# Patient Record
Sex: MALE | Race: WHITE | NOT HISPANIC OR LATINO | Employment: OTHER | URBAN - METROPOLITAN AREA
[De-identification: names, ages, dates, MRNs, and addresses within clinical notes are randomized per-mention and may not be internally consistent; named-entity substitution may affect disease eponyms.]

---

## 2018-04-24 ENCOUNTER — LAB VISIT (OUTPATIENT)
Dept: LAB | Facility: HOSPITAL | Age: 68
End: 2018-04-24
Attending: FAMILY MEDICINE
Payer: MEDICARE

## 2018-04-24 DIAGNOSIS — R10.9 ABDOMINAL PAIN: Primary | ICD-10-CM

## 2018-04-24 LAB
AMYLASE SERPL-CCNC: 74 U/L
ERYTHROCYTE [DISTWIDTH] IN BLOOD BY AUTOMATED COUNT: 13 %
ERYTHROCYTE [SEDIMENTATION RATE] IN BLOOD BY WESTERGREN METHOD: 0 MM/HR
HCT VFR BLD AUTO: 49.2 %
HGB BLD-MCNC: 16.9 G/DL
LIPASE SERPL-CCNC: 41 U/L
MCH RBC QN AUTO: 33.3 PG
MCHC RBC AUTO-ENTMCNC: 34.3 G/DL
MCV RBC AUTO: 97 FL
PLATELET # BLD AUTO: 211 K/UL
PMV BLD AUTO: 10.8 FL
RBC # BLD AUTO: 5.08 M/UL
WBC # BLD AUTO: 9.11 K/UL

## 2018-04-24 PROCEDURE — 82150 ASSAY OF AMYLASE: CPT

## 2018-04-24 PROCEDURE — 36415 COLL VENOUS BLD VENIPUNCTURE: CPT

## 2018-04-24 PROCEDURE — 85651 RBC SED RATE NONAUTOMATED: CPT

## 2018-04-24 PROCEDURE — 85027 COMPLETE CBC AUTOMATED: CPT

## 2018-04-24 PROCEDURE — 83690 ASSAY OF LIPASE: CPT

## 2021-12-30 DIAGNOSIS — R07.9 CHEST PAIN, UNSPECIFIED: ICD-10-CM

## 2021-12-30 DIAGNOSIS — K21.00 REFLUX ESOPHAGITIS: ICD-10-CM

## 2021-12-30 DIAGNOSIS — R06.02 SOB (SHORTNESS OF BREATH): Primary | ICD-10-CM

## 2022-01-05 ENCOUNTER — HOSPITAL ENCOUNTER (OUTPATIENT)
Dept: RADIOLOGY | Facility: HOSPITAL | Age: 72
Discharge: HOME | End: 2022-01-05
Attending: FAMILY MEDICINE
Payer: MEDICARE

## 2022-01-05 ENCOUNTER — HOSPITAL ENCOUNTER (OUTPATIENT)
Dept: CARDIOLOGY | Facility: HOSPITAL | Age: 72
Discharge: HOME | End: 2022-01-05
Attending: FAMILY MEDICINE
Payer: MEDICARE

## 2022-01-05 DIAGNOSIS — K21.00 REFLUX ESOPHAGITIS: ICD-10-CM

## 2022-01-05 DIAGNOSIS — I10 HBP (HIGH BLOOD PRESSURE): ICD-10-CM

## 2022-01-05 DIAGNOSIS — R07.9 CHEST PAIN, UNSPECIFIED: ICD-10-CM

## 2022-01-05 DIAGNOSIS — R06.02 SOB (SHORTNESS OF BREATH): ICD-10-CM

## 2022-01-05 LAB
AORTIC ROOT ANNULUS: 3.22 CM
AORTIC VALVE CUSP SEPERATION: 2.11 CM
AV INDEX (PROSTH): 0.81
AV MEAN GRADIENT: 2 MMHG
AV PEAK GRADIENT: 4 MMHG
AV VALVE AREA: 3.73 CM2
AV VELOCITY RATIO: 0.93
CV ECHO LV RWT: 0.3 CM
DOP CALC AO PEAK VEL: 0.97 M/S
DOP CALC AO VTI: 22.83 CM
DOP CALC LVOT AREA: 4.6 CM2
DOP CALC LVOT DIAMETER: 2.42 CM
DOP CALC LVOT PEAK VEL: 0.9 M/S
DOP CALC LVOT STROKE VOLUME: 85.23 CM3
DOP CALC MV VTI: 26.74 CM
DOP CALCLVOT PEAK VEL VTI: 18.54 CM
E WAVE DECELERATION TIME: 375.21 MSEC
E/A RATIO: 0.73
E/E' RATIO: 7.38 M/S
ECHO LV POSTERIOR WALL: 0.82 CM (ref 0.6–1.1)
EJECTION FRACTION: 60 %
FRACTIONAL SHORTENING: 32 % (ref 28–44)
INTERVENTRICULAR SEPTUM: 1.02 CM (ref 0.6–1.1)
LA MAJOR: 4.32 CM
LA MINOR: 3.33 CM
LEFT ATRIUM SIZE: 3.43 CM
LEFT ATRIUM VOLUME MOD: 13.77 CM3
LEFT INTERNAL DIMENSION IN SYSTOLE: 3.75 CM (ref 2.1–4)
LEFT VENTRICLE DIASTOLIC VOLUME: 148.02 ML
LEFT VENTRICLE SYSTOLIC VOLUME: 59.96 ML
LEFT VENTRICULAR INTERNAL DIMENSION IN DIASTOLE: 5.51 CM (ref 3.5–6)
LEFT VENTRICULAR MASS: 191.78 G
LV LATERAL E/E' RATIO: 8 M/S
LV SEPTAL E/E' RATIO: 6.86 M/S
MV MEAN GRADIENT: 0 MMHG
MV PEAK A VEL: 0.66 M/S
MV PEAK E VEL: 0.48 M/S
MV PEAK GRADIENT: 2 MMHG
MV STENOSIS PRESSURE HALF TIME: 108.81 MS
MV VALVE AREA BY CONTINUITY EQUATION: 3.19 CM2
MV VALVE AREA P 1/2 METHOD: 2.02 CM2
PISA TR MAX VEL: 1.63 M/S
PV PEAK VELOCITY: 0.63 CM/S
RA MAJOR: 4.56 CM
RA PRESSURE: 3 MMHG
RIGHT VENTRICULAR END-DIASTOLIC DIMENSION: 3.04 CM
TDI LATERAL: 0.06 M/S
TDI SEPTAL: 0.07 M/S
TDI: 0.07 M/S
TR MAX PG: 11 MMHG
TV REST PULMONARY ARTERY PRESSURE: 14 MMHG

## 2022-01-05 PROCEDURE — 71046 XR CHEST PA AND LATERAL: ICD-10-PCS | Mod: 26,,, | Performed by: RADIOLOGY

## 2022-01-05 PROCEDURE — 93306 ECHO (CUPID ONLY): ICD-10-PCS | Mod: 26,,, | Performed by: INTERNAL MEDICINE

## 2022-01-05 PROCEDURE — 93356 ECHO (CUPID ONLY): ICD-10-PCS | Mod: ,,, | Performed by: INTERNAL MEDICINE

## 2022-01-05 PROCEDURE — 93356 MYOCRD STRAIN IMG SPCKL TRCK: CPT | Mod: ,,, | Performed by: INTERNAL MEDICINE

## 2022-01-05 PROCEDURE — 93306 TTE W/DOPPLER COMPLETE: CPT | Mod: 26,,, | Performed by: INTERNAL MEDICINE

## 2022-01-05 PROCEDURE — 71046 X-RAY EXAM CHEST 2 VIEWS: CPT | Mod: 26,,, | Performed by: RADIOLOGY

## 2022-01-05 PROCEDURE — 93356 MYOCRD STRAIN IMG SPCKL TRCK: CPT

## 2022-01-05 PROCEDURE — 71046 X-RAY EXAM CHEST 2 VIEWS: CPT | Mod: TC,FY

## 2022-07-18 ENCOUNTER — LAB VISIT (OUTPATIENT)
Dept: LAB | Facility: HOSPITAL | Age: 72
End: 2022-07-18
Attending: FAMILY MEDICINE
Payer: MEDICARE

## 2022-07-18 DIAGNOSIS — R21 RASH: ICD-10-CM

## 2022-07-18 DIAGNOSIS — N28.9 LESION IN TRANSPLANTED KIDNEY: ICD-10-CM

## 2022-07-18 DIAGNOSIS — L53.9 REDNESS: Primary | ICD-10-CM

## 2022-07-18 DIAGNOSIS — T86.19 LESION IN TRANSPLANTED KIDNEY: ICD-10-CM

## 2022-07-18 LAB
ALBUMIN SERPL BCP-MCNC: 4.3 G/DL (ref 3.5–5.2)
ALP SERPL-CCNC: 71 U/L (ref 55–135)
ALT SERPL W/O P-5'-P-CCNC: 23 U/L (ref 10–44)
ANION GAP SERPL CALC-SCNC: 9 MMOL/L (ref 8–16)
AST SERPL-CCNC: 24 U/L (ref 10–40)
BILIRUB SERPL-MCNC: 1 MG/DL (ref 0.1–1)
BUN SERPL-MCNC: 14 MG/DL (ref 8–23)
CALCIUM SERPL-MCNC: 9.5 MG/DL (ref 8.7–10.5)
CHLORIDE SERPL-SCNC: 105 MMOL/L (ref 95–110)
CO2 SERPL-SCNC: 26 MMOL/L (ref 23–29)
CREAT SERPL-MCNC: 1.1 MG/DL (ref 0.5–1.4)
EST. GFR  (AFRICAN AMERICAN): >60 ML/MIN/1.73 M^2
EST. GFR  (NON AFRICAN AMERICAN): >60 ML/MIN/1.73 M^2
GLUCOSE SERPL-MCNC: 91 MG/DL (ref 70–110)
POTASSIUM SERPL-SCNC: 5.2 MMOL/L (ref 3.5–5.1)
PROT SERPL-MCNC: 7 G/DL (ref 6–8.4)
SODIUM SERPL-SCNC: 140 MMOL/L (ref 136–145)

## 2022-07-18 PROCEDURE — 87491 CHLMYD TRACH DNA AMP PROBE: CPT | Performed by: FAMILY MEDICINE

## 2022-07-18 PROCEDURE — 36415 COLL VENOUS BLD VENIPUNCTURE: CPT | Performed by: FAMILY MEDICINE

## 2022-07-18 PROCEDURE — 80053 COMPREHEN METABOLIC PANEL: CPT | Performed by: FAMILY MEDICINE

## 2022-07-18 PROCEDURE — 86592 SYPHILIS TEST NON-TREP QUAL: CPT | Performed by: FAMILY MEDICINE

## 2022-07-18 PROCEDURE — 87591 N.GONORRHOEAE DNA AMP PROB: CPT | Performed by: FAMILY MEDICINE

## 2022-07-19 LAB — RPR SER QL: NORMAL

## 2022-07-20 LAB
C TRACH DNA SPEC QL NAA+PROBE: NOT DETECTED
N GONORRHOEA DNA SPEC QL NAA+PROBE: NOT DETECTED

## 2022-07-28 ENCOUNTER — HOSPITAL ENCOUNTER (OUTPATIENT)
Dept: RADIOLOGY | Facility: HOSPITAL | Age: 72
Discharge: HOME | End: 2022-07-28
Attending: FAMILY MEDICINE
Payer: MEDICARE

## 2022-07-28 DIAGNOSIS — N63.20 LEFT BREAST LUMP: ICD-10-CM

## 2022-07-28 DIAGNOSIS — N64.4 BREAST TENDERNESS IN MALE: ICD-10-CM

## 2022-07-28 PROCEDURE — 77066 DX MAMMO INCL CAD BI: CPT | Mod: 26,,, | Performed by: RADIOLOGY

## 2022-07-28 PROCEDURE — 76642 US BREAST LEFT LIMITED: ICD-10-PCS | Mod: 26,LT,, | Performed by: RADIOLOGY

## 2022-07-28 PROCEDURE — 77062 MAMMO DIGITAL DIAGNOSTIC BILAT WITH TOMO: ICD-10-PCS | Mod: 26,,, | Performed by: RADIOLOGY

## 2022-07-28 PROCEDURE — 76642 ULTRASOUND BREAST LIMITED: CPT | Mod: TC,LT

## 2022-07-28 PROCEDURE — 77066 DX MAMMO INCL CAD BI: CPT | Mod: TC

## 2022-07-28 PROCEDURE — 76642 ULTRASOUND BREAST LIMITED: CPT | Mod: 26,LT,, | Performed by: RADIOLOGY

## 2022-07-28 PROCEDURE — 77062 BREAST TOMOSYNTHESIS BI: CPT | Mod: 26,,, | Performed by: RADIOLOGY

## 2022-07-28 PROCEDURE — 77066 MAMMO DIGITAL DIAGNOSTIC BILAT WITH TOMO: ICD-10-PCS | Mod: 26,,, | Performed by: RADIOLOGY

## 2022-08-04 ENCOUNTER — OFFICE VISIT (OUTPATIENT)
Dept: SURGERY | Facility: CLINIC | Age: 72
End: 2022-08-04
Payer: MEDICARE

## 2022-08-04 VITALS
DIASTOLIC BLOOD PRESSURE: 91 MMHG | HEIGHT: 72 IN | SYSTOLIC BLOOD PRESSURE: 146 MMHG | HEART RATE: 63 BPM | WEIGHT: 178 LBS | BODY MASS INDEX: 24.11 KG/M2 | OXYGEN SATURATION: 97 %

## 2022-08-04 DIAGNOSIS — R22.2 MASS OF LEFT CHEST WALL: Primary | ICD-10-CM

## 2022-08-04 PROCEDURE — 88360 PR  TUMOR IMMUNOHISTOCHEM/MANUAL: ICD-10-PCS | Mod: 26,,, | Performed by: PATHOLOGY

## 2022-08-04 PROCEDURE — 88360 TUMOR IMMUNOHISTOCHEM/MANUAL: CPT | Mod: 26,,, | Performed by: PATHOLOGY

## 2022-08-04 PROCEDURE — 99999 PR PBB SHADOW E&M-EST. PATIENT-LVL III: CPT | Mod: PBBFAC,,, | Performed by: SURGERY

## 2022-08-04 PROCEDURE — 88341 IMHCHEM/IMCYTCHM EA ADD ANTB: CPT | Mod: 26,XU,, | Performed by: PATHOLOGY

## 2022-08-04 PROCEDURE — 88341 IMHCHEM/IMCYTCHM EA ADD ANTB: CPT | Mod: 59 | Performed by: PATHOLOGY

## 2022-08-04 PROCEDURE — 88305 TISSUE EXAM BY PATHOLOGIST: CPT | Mod: 26,,, | Performed by: PATHOLOGY

## 2022-08-04 PROCEDURE — 88341 PR IHC OR ICC EACH ADD'L SINGLE ANTIBODY  STAINPR: ICD-10-PCS | Mod: 26,XU,, | Performed by: PATHOLOGY

## 2022-08-04 PROCEDURE — 88360 TUMOR IMMUNOHISTOCHEM/MANUAL: CPT | Mod: 59 | Performed by: PATHOLOGY

## 2022-08-04 PROCEDURE — 88342 IMHCHEM/IMCYTCHM 1ST ANTB: CPT | Mod: 26,XU,, | Performed by: PATHOLOGY

## 2022-08-04 PROCEDURE — 99205 OFFICE O/P NEW HI 60 MIN: CPT | Mod: S$PBB,,, | Performed by: SURGERY

## 2022-08-04 PROCEDURE — 88342 IMHCHEM/IMCYTCHM 1ST ANTB: CPT | Performed by: PATHOLOGY

## 2022-08-04 PROCEDURE — 99205 PR OFFICE/OUTPT VISIT, NEW, LEVL V, 60-74 MIN: ICD-10-PCS | Mod: S$PBB,,, | Performed by: SURGERY

## 2022-08-04 PROCEDURE — 99213 OFFICE O/P EST LOW 20 MIN: CPT | Mod: PBBFAC | Performed by: SURGERY

## 2022-08-04 PROCEDURE — 88305 TISSUE EXAM BY PATHOLOGIST: CPT | Performed by: PATHOLOGY

## 2022-08-04 PROCEDURE — 88342 CHG IMMUNOCYTOCHEMISTRY: ICD-10-PCS | Mod: 26,XU,, | Performed by: PATHOLOGY

## 2022-08-04 PROCEDURE — 99999 PR PBB SHADOW E&M-EST. PATIENT-LVL III: ICD-10-PCS | Mod: PBBFAC,,, | Performed by: SURGERY

## 2022-08-04 PROCEDURE — 88305 TISSUE EXAM BY PATHOLOGIST: ICD-10-PCS | Mod: 26,,, | Performed by: PATHOLOGY

## 2022-08-04 NOTE — PROGRESS NOTES
[facility] Surgery H&P Note    Subjective:       Patient ID: Perez Kenyon is a 72 y.o. male.    Chief Complaint:  Doc I have a left chest mass.  HPI:  Perez Kenyon is a 72 y.o. male with no past medical history presents today as a new patient referral from Dr. Maguire for evaluation of left chest mass.  Patient stated that it started a couple weeks ago.  Hard area.  Behind nipple.  Left side.  Underwent mammogram and ultrasound.  Mammogram and ultrasound suggest BI-RADS 4, suspicious.  Biopsy recommended.  Patient subsequently referred surgery Clinic today for evaluation.    History reviewed. No pertinent past medical history.  Past Surgical History:   Procedure Laterality Date    ROTATOR CUFF REPAIR Right 2008     History reviewed. No pertinent family history.  Social History     Socioeconomic History    Marital status:    Occupational History    Occupation: retired   Tobacco Use    Smoking status: Never Smoker    Smokeless tobacco: Never Used   Substance and Sexual Activity    Alcohol use: Yes    Drug use: Not Currently    Sexual activity: Yes     Partners: Female       No current outpatient medications on file.     No current facility-administered medications for this visit.     Review of patient's allergies indicates:  No Known Allergies    Review of Systems   Constitutional: Negative for appetite change, chills and fever.   HENT: Negative for congestion, dental problem and drooling.    Eyes: Negative for photophobia, discharge and itching.   Respiratory: Negative for apnea and chest tightness.    Cardiovascular: Negative for chest pain, palpitations and leg swelling.   Gastrointestinal: Negative for abdominal distention and abdominal pain.   Endocrine: Negative for cold intolerance and heat intolerance.   Genitourinary: Negative for difficulty urinating and dysuria.   Musculoskeletal: Negative for arthralgias and back pain.   Skin: Negative for color change and pallor.    Neurological: Negative for dizziness, facial asymmetry and headaches.   Hematological: Negative for adenopathy. Does not bruise/bleed easily.   Psychiatric/Behavioral: Negative for agitation, behavioral problems and confusion.       Objective:      Vitals:    08/04/22 1040   BP: (!) 146/91   BP Location: Left arm   Patient Position: Sitting   BP Method: Medium (Automatic)   Pulse: 63   SpO2: 97%   Weight: 80.7 kg (178 lb)   Height: 6' (1.829 m)     Physical Exam  Constitutional:       Appearance: He is well-developed. He is not diaphoretic.   HENT:      Head: Normocephalic and atraumatic.   Eyes:      Pupils: Pupils are equal, round, and reactive to light.   Neck:      Thyroid: No thyromegaly.   Cardiovascular:      Rate and Rhythm: Normal rate and regular rhythm.      Heart sounds: No murmur heard.  Pulmonary:      Effort: Pulmonary effort is normal. No respiratory distress.      Breath sounds: Normal breath sounds.   Chest:       Abdominal:      General: Bowel sounds are normal. There is no distension.      Palpations: Abdomen is soft.      Tenderness: There is no abdominal tenderness.   Musculoskeletal:         General: Normal range of motion.      Cervical back: Normal range of motion and neck supple.   Skin:     General: Skin is warm.      Capillary Refill: Capillary refill takes less than 2 seconds.      Findings: No erythema or rash.   Neurological:      Mental Status: He is alert and oriented to person, place, and time.      Cranial Nerves: No cranial nerve deficit.       Mammogram ultrasound reviewed.     Assessment:       1. Mass of left chest wall        Plan:   Mass of left chest wall        Medical Decision Making/Counseling:  Mass left chest wall.  Concerning.  BI-RADS 4.  Recommended core needle biopsy today in clinic.  Patient agreed.  Proceed with punch biopsy in clinic.  X2.  Will send specimen [facility].  Will have patient follow up next week.    Patient was localized with 0.25%  Marcaine with epinephrine.  Left chest wall prepped draped.  4 mm punch biopsy was used x2 for excision.  Handed off in permanent section.  Dry dressings applied over top of procedural site.  Time-out was conducted prior to procedure.  Informed consent was obtained prior and sign.    Patient instructed that best way to communicate with my office staff is for patient to get on the [facility] epic patient portal to expedite communication and communication issues that may occur.  Patient was given instructions on how to get on the portal.  I encouraged patient to obtain portal access as well.  Ultimately it is up to the patient to obtain access.  Patient voiced understanding.

## 2022-08-10 DIAGNOSIS — N50.89 SCROTAL MASS: Primary | ICD-10-CM

## 2022-08-12 ENCOUNTER — HOSPITAL ENCOUNTER (OUTPATIENT)
Dept: RADIOLOGY | Facility: HOSPITAL | Age: 72
Discharge: HOME | End: 2022-08-12
Attending: SPECIALIST
Payer: MEDICARE

## 2022-08-12 DIAGNOSIS — N50.89 SCROTAL MASS: ICD-10-CM

## 2022-08-12 PROCEDURE — 76870 US EXAM SCROTUM: CPT | Mod: TC

## 2022-08-15 LAB
COMMENT: NORMAL
FINAL PATHOLOGIC DIAGNOSIS: NORMAL
GROSS: NORMAL
Lab: NORMAL
MICROSCOPIC EXAM: NORMAL

## 2022-08-16 ENCOUNTER — ANESTHESIA EVENT (OUTPATIENT)
Dept: SURGERY | Facility: HOSPITAL | Age: 72
End: 2022-08-16
Payer: MEDICARE

## 2022-08-16 ENCOUNTER — HOSPITAL ENCOUNTER (OUTPATIENT)
Dept: CARDIOLOGY | Facility: HOSPITAL | Age: 72
Discharge: HOME | End: 2022-08-16
Attending: SURGERY
Payer: MEDICARE

## 2022-08-16 ENCOUNTER — OFFICE VISIT (OUTPATIENT)
Dept: SURGERY | Facility: CLINIC | Age: 72
End: 2022-08-16
Payer: MEDICARE

## 2022-08-16 ENCOUNTER — HOSPITAL ENCOUNTER (OUTPATIENT)
Dept: PREADMISSION TESTING | Facility: HOSPITAL | Age: 72
Discharge: HOME | End: 2022-08-16
Attending: SURGERY
Payer: MEDICARE

## 2022-08-16 VITALS
HEART RATE: 58 BPM | WEIGHT: 179 LBS | BODY MASS INDEX: 24.24 KG/M2 | HEIGHT: 72 IN | OXYGEN SATURATION: 98 % | SYSTOLIC BLOOD PRESSURE: 178 MMHG | DIASTOLIC BLOOD PRESSURE: 92 MMHG

## 2022-08-16 DIAGNOSIS — Z17.0 MALIGNANT NEOPLASM OF NIPPLE OF LEFT BREAST IN MALE, ESTROGEN RECEPTOR POSITIVE: ICD-10-CM

## 2022-08-16 DIAGNOSIS — Z17.0 MALIGNANT NEOPLASM OF NIPPLE OF LEFT BREAST IN MALE, ESTROGEN RECEPTOR POSITIVE: Primary | ICD-10-CM

## 2022-08-16 DIAGNOSIS — C50.022 MALIGNANT NEOPLASM OF NIPPLE OF LEFT BREAST IN MALE, ESTROGEN RECEPTOR POSITIVE: Primary | ICD-10-CM

## 2022-08-16 DIAGNOSIS — C50.022 MALIGNANT NEOPLASM OF NIPPLE OF LEFT BREAST IN MALE, ESTROGEN RECEPTOR POSITIVE: ICD-10-CM

## 2022-08-16 PROCEDURE — 93010 EKG 12-LEAD: ICD-10-PCS | Mod: ,,, | Performed by: INTERNAL MEDICINE

## 2022-08-16 PROCEDURE — 99215 PR OFFICE/OUTPT VISIT, EST, LEVL V, 40-54 MIN: ICD-10-PCS | Mod: S$PBB,,, | Performed by: SURGERY

## 2022-08-16 PROCEDURE — 93010 ELECTROCARDIOGRAM REPORT: CPT | Mod: ,,, | Performed by: INTERNAL MEDICINE

## 2022-08-16 PROCEDURE — 99215 OFFICE O/P EST HI 40 MIN: CPT | Mod: S$PBB,,, | Performed by: SURGERY

## 2022-08-16 PROCEDURE — 99214 OFFICE O/P EST MOD 30 MIN: CPT | Mod: PBBFAC | Performed by: SURGERY

## 2022-08-16 PROCEDURE — 93005 ELECTROCARDIOGRAM TRACING: CPT

## 2022-08-16 PROCEDURE — 99999 PR PBB SHADOW E&M-EST. PATIENT-LVL IV: ICD-10-PCS | Mod: PBBFAC,,, | Performed by: SURGERY

## 2022-08-16 PROCEDURE — 99999 PR PBB SHADOW E&M-EST. PATIENT-LVL IV: CPT | Mod: PBBFAC,,, | Performed by: SURGERY

## 2022-08-16 RX ORDER — SODIUM CHLORIDE 9 MG/ML
INJECTION, SOLUTION INTRAVENOUS CONTINUOUS
Status: CANCELLED | OUTPATIENT
Start: 2022-08-16

## 2022-08-16 NOTE — PROGRESS NOTES
Patient scheduled for surgical procedure and pre-admit per Dr. Tinajero. Pre-admit scheduled on August 22, 2022. Left Mastectomy procedure scheduled for August 24, 2022. Patient given blue folder containing all pre-op, elizabeth-op, and post op instructions.

## 2022-08-16 NOTE — ANESTHESIA PREPROCEDURE EVALUATION
08/16/2022  Perez Kenyon is a 72 y.o., male.      Pre-op Assessment    I have reviewed the Patient Summary Reports.     I have reviewed the Nursing Notes.    I have reviewed the Medications.     Review of Systems  Anesthesia Hx:  No problems with previous Anesthesia  Neg history of prior surgery. Denies Family Hx of Anesthesia complications.   Denies Personal Hx of Anesthesia complications.   Social:  Non-Smoker    Hematology/Oncology:  Hematology Normal   Oncology Normal     EENT/Dental:EENT/Dental Normal   Cardiovascular:  Cardiovascular Normal     Pulmonary:  Pulmonary Normal    Renal/:  Renal/ Normal     Hepatic/GI:  Hepatic/GI Normal    Musculoskeletal:  Musculoskeletal Normal    Neurological:  Neurology Normal    Endocrine:  Endocrine Normal    Dermatological:  Skin Normal    Psych:  Psychiatric Normal           Physical Exam  General: Alert    Airway:  Mallampati: II   Mouth Opening: Normal  TM Distance: Normal  Neck ROM: Normal ROM    Dental:  Intact    Chest/Lungs:  Clear to auscultation, Normal Respiratory Rate    Heart:  Rate: Normal    Abdomen:  Normal        Anesthesia Plan  Type of Anesthesia, risks & benefits discussed:    Anesthesia Type: Gen ETT  Post Op Pain Control Plan: multimodal analgesia  Airway Plan: Direct, Post-Induction  Informed Consent: Patient consented to blood products? No  ASA Score: 2  Day of Surgery Review of History & Physical: H&P Update referred to the surgeon/provider.    Ready For Surgery From Anesthesia Perspective.     .

## 2022-08-16 NOTE — H&P
[facility] Surgery H&P Note    Subjective:       Patient ID: Perez Kenyon is a 72 y.o. male.    Chief Complaint:  Follow-up left chest pathology.  HPI:  Perez Kenyon is a 72 y.o. male history of no past medical problems presents today for follow-up evaluation after punch biopsy of left chest mass.  Biopsy revealed evidence of invasive ductal carcinoma ER, NV positive.  Her 2 Nu negative.  No clinical adenopathy in the axilla.  Patient desires to proceed with surgical intervention.    History reviewed. No pertinent past medical history.  Past Surgical History:   Procedure Laterality Date    ROTATOR CUFF REPAIR Right 2008     History reviewed. No pertinent family history.  Social History     Socioeconomic History    Marital status:    Occupational History    Occupation: retired   Tobacco Use    Smoking status: Never Smoker    Smokeless tobacco: Never Used   Substance and Sexual Activity    Alcohol use: Yes    Drug use: Not Currently    Sexual activity: Yes     Partners: Female       No current outpatient medications on file.     No current facility-administered medications for this visit.     Review of patient's allergies indicates:  No Known Allergies    Review of Systems   Constitutional: Negative for appetite change, chills and fever.   HENT: Negative for congestion, dental problem and drooling.    Eyes: Negative for photophobia, discharge and itching.   Respiratory: Negative for apnea and chest tightness.    Cardiovascular: Negative for chest pain, palpitations and leg swelling.   Gastrointestinal: Negative for abdominal distention and abdominal pain.   Endocrine: Negative for cold intolerance and heat intolerance.   Genitourinary: Negative for difficulty urinating and dysuria.   Musculoskeletal: Negative for arthralgias and back pain.   Skin: Negative for color change and pallor.   Neurological: Negative for dizziness, facial asymmetry and headaches.   Hematological: Negative for  adenopathy. Does not bruise/bleed easily.   Psychiatric/Behavioral: Negative for agitation, behavioral problems and confusion.       Objective:      Vitals:    08/16/22 1017   BP: (!) 178/92   BP Location: Left arm   Patient Position: Sitting   BP Method: Medium (Automatic)   Pulse: (!) 58   SpO2: 98%   Weight: 81.2 kg (179 lb)   Height: 6' (1.829 m)     Physical Exam  Constitutional:       Appearance: He is well-developed. He is not diaphoretic.   HENT:      Head: Normocephalic and atraumatic.   Eyes:      Pupils: Pupils are equal, round, and reactive to light.   Neck:      Thyroid: No thyromegaly.   Cardiovascular:      Rate and Rhythm: Normal rate and regular rhythm.      Heart sounds: No murmur heard.  Pulmonary:      Effort: Pulmonary effort is normal. No respiratory distress.      Breath sounds: Normal breath sounds.   Chest:       Abdominal:      General: Bowel sounds are normal. There is no distension.      Palpations: Abdomen is soft.      Tenderness: There is no abdominal tenderness.   Musculoskeletal:         General: Normal range of motion.      Cervical back: Normal range of motion and neck supple.   Skin:     General: Skin is warm.      Capillary Refill: Capillary refill takes less than 2 seconds.      Findings: No erythema or rash.   Neurological:      Mental Status: He is alert and oriented to person, place, and time.      Cranial Nerves: No cranial nerve deficit.         Lab Review:   CBC:   Lab Results   Component Value Date    WBC 9.11 04/24/2018    RBC 5.08 04/24/2018    HGB 16.9 04/24/2018    HCT 49.2 04/24/2018     04/24/2018     BMP:   Lab Results   Component Value Date    GLU 91 07/18/2022     07/18/2022    K 5.2 (H) 07/18/2022     07/18/2022    CO2 26 07/18/2022    BUN 14 07/18/2022    CREATININE 1.1 07/18/2022    CALCIUM 9.5 07/18/2022     Diagnostics Review:  Pathology reviewed.  Invasive ductal carcinoma.  ER TX positive HER2/karthikeyan negative.     Assessment:       1.  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, estrogen receptor positive  -     Case Request Operating Room: MASTECTOMY, BIOPSY, LYMPH NODE, AXILLARY  -     Full code; Standing  -     Place in Outpatient; Standing  -     Vital signs; Standing  -     Insert peripheral IV; Standing  -     Verify beta-blocker dose taken within 24 hours if patient is prescribed beta-blocker; Standing  -     Height and weight; Standing  -     Intake and output; Standing  -     Verify discontinuation of antithrombotics; Standing  -     POCT glucose; Standing  -     Verify blood consent; Standing  -     Verify consent; Standing  -     Diet NPO; Standing  -     Place sequential compression device; Standing  -     CBC Auto Differential; Future; Expected date: 11/16/2022  -     EKG 12-lead; Future; Expected date: 09/16/2022    Other orders  -     0.9%  NaCl infusion  -     ceFAZolin (ANCEF) 2 g in dextrose 5 % 50 mL IVPB        Medical Decision Making/Counseling:  Patient with left chest wall invasive ductal carcinoma.  Clinically negative nodes.  Options to include lumpectomy with radiation versus mastectomy have been discussed.  Lymph node sampling left side have been discussed.  After informed discussion with the patient and family in clinic today, risk benefits alternatives etcetera, they voiced understanding of the options as well as risk benefits and desires to proceed with left mastectomy with axillary sampling.  All questions were answered to the patient and family's satisfaction.    Total clinic time today with patient, in face-to-face interaction was 45 min, of which greater than half of that time was spent in face-to-face counseling.    Patient instructed that best way to communicate with my office staff is for patient to get on the [facility] [employer] patient portal to expedite communication and communication issues that may occur.   Patient was given instructions on how to get on the portal.  I encouraged patient to obtain portal access as well.  Ultimately it is up to the patient to obtain access.  Patient voiced understanding.

## 2022-08-16 NOTE — PROGRESS NOTES
Patient scheduled for surgical procedure and pre-admit per Dr. Tinajero. Pre-admit scheduled on August 16, 2022. Left Mastectomy procedure scheduled for August 17, 2022. Patient given blue folder containing all pre-op, elizabeth-op, and post op instructions.

## 2022-08-17 ENCOUNTER — HOSPITAL ENCOUNTER (OUTPATIENT)
Facility: HOSPITAL | Age: 72
Discharge: HOME | End: 2022-08-18
Attending: SURGERY | Admitting: SURGERY
Payer: MEDICARE

## 2022-08-17 ENCOUNTER — HOSPITAL ENCOUNTER (OUTPATIENT)
Dept: RADIOLOGY | Facility: HOSPITAL | Age: 72
Discharge: HOME | End: 2022-08-17
Attending: SURGERY
Payer: MEDICARE

## 2022-08-17 ENCOUNTER — ANESTHESIA (OUTPATIENT)
Dept: SURGERY | Facility: HOSPITAL | Age: 72
End: 2022-08-17
Payer: MEDICARE

## 2022-08-17 VITALS
HEART RATE: 62 BPM | OXYGEN SATURATION: 97 % | DIASTOLIC BLOOD PRESSURE: 103 MMHG | TEMPERATURE: 98 F | RESPIRATION RATE: 15 BRPM | SYSTOLIC BLOOD PRESSURE: 142 MMHG

## 2022-08-17 DIAGNOSIS — C50.912 MALIGNANT NEOPLASM OF LEFT BREAST: ICD-10-CM

## 2022-08-17 DIAGNOSIS — C50.022 MALIGNANT NEOPLASM OF NIPPLE OF LEFT BREAST IN MALE, ESTROGEN RECEPTOR POSITIVE: Primary | ICD-10-CM

## 2022-08-17 DIAGNOSIS — Z17.0 MALIGNANT NEOPLASM OF NIPPLE OF LEFT BREAST IN MALE, ESTROGEN RECEPTOR POSITIVE: Primary | ICD-10-CM

## 2022-08-17 DIAGNOSIS — N63.20 LEFT BREAST MASS: ICD-10-CM

## 2022-08-17 PROCEDURE — 63600175 PHARM REV CODE 636 W HCPCS: Performed by: NURSE ANESTHETIST, CERTIFIED REGISTERED

## 2022-08-17 PROCEDURE — 71000033 HC RECOVERY, INTIAL HOUR: Performed by: SURGERY

## 2022-08-17 PROCEDURE — 19307 PR MASTECTOMY, MODIFIED RADICAL: ICD-10-PCS | Mod: LT,,, | Performed by: SURGERY

## 2022-08-17 PROCEDURE — C1729 CATH, DRAINAGE: HCPCS | Performed by: SURGERY

## 2022-08-17 PROCEDURE — D9220A PRA ANESTHESIA: Mod: CRNA,,, | Performed by: NURSE ANESTHETIST, CERTIFIED REGISTERED

## 2022-08-17 PROCEDURE — 36000707: Performed by: SURGERY

## 2022-08-17 PROCEDURE — 38900 IO MAP OF SENT LYMPH NODE: CPT | Mod: LT,,, | Performed by: SURGERY

## 2022-08-17 PROCEDURE — D9220A PRA ANESTHESIA: Mod: ANES,,, | Performed by: ANESTHESIOLOGY

## 2022-08-17 PROCEDURE — 88341 PR IHC OR ICC EACH ADD'L SINGLE ANTIBODY  STAINPR: ICD-10-PCS | Mod: 26,,, | Performed by: PATHOLOGY

## 2022-08-17 PROCEDURE — 88341 IMHCHEM/IMCYTCHM EA ADD ANTB: CPT | Mod: 26,,, | Performed by: PATHOLOGY

## 2022-08-17 PROCEDURE — 78195 LYMPH SYSTEM IMAGING: CPT | Mod: 26,,, | Performed by: RADIOLOGY

## 2022-08-17 PROCEDURE — D9220A PRA ANESTHESIA: ICD-10-PCS | Mod: ANES,,, | Performed by: ANESTHESIOLOGY

## 2022-08-17 PROCEDURE — 19307 MAST MOD RAD: CPT | Mod: LT,,, | Performed by: SURGERY

## 2022-08-17 PROCEDURE — 63600175 PHARM REV CODE 636 W HCPCS: Performed by: ANESTHESIOLOGY

## 2022-08-17 PROCEDURE — 88342 IMHCHEM/IMCYTCHM 1ST ANTB: CPT | Mod: 26,,, | Performed by: PATHOLOGY

## 2022-08-17 PROCEDURE — 88309 TISSUE EXAM BY PATHOLOGIST: CPT | Mod: 26,,, | Performed by: PATHOLOGY

## 2022-08-17 PROCEDURE — 25000003 PHARM REV CODE 250: Performed by: SURGERY

## 2022-08-17 PROCEDURE — 88342 CHG IMMUNOCYTOCHEMISTRY: ICD-10-PCS | Mod: 26,,, | Performed by: PATHOLOGY

## 2022-08-17 PROCEDURE — 36000706: Performed by: SURGERY

## 2022-08-17 PROCEDURE — 88342 IMHCHEM/IMCYTCHM 1ST ANTB: CPT | Performed by: PATHOLOGY

## 2022-08-17 PROCEDURE — 71000039 HC RECOVERY, EACH ADD'L HOUR: Performed by: SURGERY

## 2022-08-17 PROCEDURE — 88309 TISSUE EXAM BY PATHOLOGIST: CPT | Performed by: PATHOLOGY

## 2022-08-17 PROCEDURE — 78195 NM LYMPHATICS AND LYMPH NODE IMAGING: ICD-10-PCS | Mod: 26,,, | Performed by: RADIOLOGY

## 2022-08-17 PROCEDURE — 88341 IMHCHEM/IMCYTCHM EA ADD ANTB: CPT | Performed by: PATHOLOGY

## 2022-08-17 PROCEDURE — 37000008 HC ANESTHESIA 1ST 15 MINUTES: Performed by: SURGERY

## 2022-08-17 PROCEDURE — 37000009 HC ANESTHESIA EA ADD 15 MINS: Performed by: SURGERY

## 2022-08-17 PROCEDURE — 88309 PR  SURG PATH,LEVEL VI: ICD-10-PCS | Mod: 26,,, | Performed by: PATHOLOGY

## 2022-08-17 PROCEDURE — A9541 TC99M SULFUR COLLOID: HCPCS

## 2022-08-17 PROCEDURE — 63600175 PHARM REV CODE 636 W HCPCS: Performed by: SURGERY

## 2022-08-17 PROCEDURE — 38900 PR INTRAOPERATIVE SENTINEL LYMPH NODE ID W DYE INJECTION: ICD-10-PCS | Mod: LT,,, | Performed by: SURGERY

## 2022-08-17 PROCEDURE — 25000003 PHARM REV CODE 250: Performed by: NURSE ANESTHETIST, CERTIFIED REGISTERED

## 2022-08-17 PROCEDURE — 71000015 HC POSTOP RECOV 1ST HR: Performed by: SURGERY

## 2022-08-17 PROCEDURE — D9220A PRA ANESTHESIA: ICD-10-PCS | Mod: CRNA,,, | Performed by: NURSE ANESTHETIST, CERTIFIED REGISTERED

## 2022-08-17 RX ORDER — KETOROLAC TROMETHAMINE 30 MG/ML
15 INJECTION, SOLUTION INTRAMUSCULAR; INTRAVENOUS ONCE
Status: COMPLETED | OUTPATIENT
Start: 2022-08-17 | End: 2022-08-17

## 2022-08-17 RX ORDER — SODIUM CHLORIDE 9 MG/ML
INJECTION, SOLUTION INTRAVENOUS CONTINUOUS
Status: DISCONTINUED | OUTPATIENT
Start: 2022-08-17 | End: 2022-08-18 | Stop reason: HOSPADM

## 2022-08-17 RX ORDER — OXYCODONE AND ACETAMINOPHEN 5; 325 MG/1; MG/1
1 TABLET ORAL EVERY 6 HOURS PRN
Qty: 30 TABLET | Refills: 0 | Status: SHIPPED | OUTPATIENT
Start: 2022-08-17 | End: 2022-08-27

## 2022-08-17 RX ORDER — EPHEDRINE SULFATE 50 MG/ML
INJECTION, SOLUTION INTRAVENOUS
Status: DISCONTINUED | OUTPATIENT
Start: 2022-08-17 | End: 2022-08-17

## 2022-08-17 RX ORDER — ONDANSETRON 2 MG/ML
4 INJECTION INTRAMUSCULAR; INTRAVENOUS DAILY PRN
Status: DISCONTINUED | OUTPATIENT
Start: 2022-08-17 | End: 2022-08-18 | Stop reason: HOSPADM

## 2022-08-17 RX ORDER — ONDANSETRON 4 MG/1
4 TABLET, FILM COATED ORAL EVERY 6 HOURS PRN
Qty: 30 TABLET | Refills: 0 | Status: SHIPPED | OUTPATIENT
Start: 2022-08-17 | End: 2022-08-27

## 2022-08-17 RX ORDER — ONDANSETRON 2 MG/ML
INJECTION INTRAMUSCULAR; INTRAVENOUS
Status: DISCONTINUED | OUTPATIENT
Start: 2022-08-17 | End: 2022-08-17

## 2022-08-17 RX ORDER — MORPHINE SULFATE 4 MG/ML
2 INJECTION, SOLUTION INTRAMUSCULAR; INTRAVENOUS EVERY 5 MIN PRN
Status: DISCONTINUED | OUTPATIENT
Start: 2022-08-17 | End: 2022-08-18 | Stop reason: HOSPADM

## 2022-08-17 RX ORDER — LIDOCAINE HYDROCHLORIDE 20 MG/ML
INJECTION, SOLUTION EPIDURAL; INFILTRATION; INTRACAUDAL; PERINEURAL
Status: DISCONTINUED | OUTPATIENT
Start: 2022-08-17 | End: 2022-08-17

## 2022-08-17 RX ORDER — CEFAZOLIN SODIUM 2 G/50ML
2 SOLUTION INTRAVENOUS
Status: COMPLETED | OUTPATIENT
Start: 2022-08-17 | End: 2022-08-17

## 2022-08-17 RX ORDER — FENTANYL CITRATE 50 UG/ML
INJECTION, SOLUTION INTRAMUSCULAR; INTRAVENOUS
Status: DISCONTINUED | OUTPATIENT
Start: 2022-08-17 | End: 2022-08-17

## 2022-08-17 RX ORDER — SODIUM CHLORIDE, SODIUM LACTATE, POTASSIUM CHLORIDE, CALCIUM CHLORIDE 600; 310; 30; 20 MG/100ML; MG/100ML; MG/100ML; MG/100ML
INJECTION, SOLUTION INTRAVENOUS CONTINUOUS
Status: DISCONTINUED | OUTPATIENT
Start: 2022-08-17 | End: 2022-08-18 | Stop reason: HOSPADM

## 2022-08-17 RX ORDER — OXYCODONE AND ACETAMINOPHEN 5; 325 MG/1; MG/1
1 TABLET ORAL
Status: DISCONTINUED | OUTPATIENT
Start: 2022-08-17 | End: 2022-08-18 | Stop reason: HOSPADM

## 2022-08-17 RX ORDER — MIDAZOLAM HYDROCHLORIDE 1 MG/ML
INJECTION INTRAMUSCULAR; INTRAVENOUS
Status: DISCONTINUED | OUTPATIENT
Start: 2022-08-17 | End: 2022-08-17

## 2022-08-17 RX ORDER — BUPIVACAINE HYDROCHLORIDE 5 MG/ML
INJECTION, SOLUTION EPIDURAL; INTRACAUDAL
Status: DISCONTINUED | OUTPATIENT
Start: 2022-08-17 | End: 2022-08-17 | Stop reason: HOSPADM

## 2022-08-17 RX ORDER — PROPOFOL 10 MG/ML
VIAL (ML) INTRAVENOUS
Status: DISCONTINUED | OUTPATIENT
Start: 2022-08-17 | End: 2022-08-17

## 2022-08-17 RX ORDER — LIDOCAINE HYDROCHLORIDE 10 MG/ML
1 INJECTION, SOLUTION EPIDURAL; INFILTRATION; INTRACAUDAL; PERINEURAL ONCE
Status: DISCONTINUED | OUTPATIENT
Start: 2022-08-17 | End: 2022-08-18 | Stop reason: HOSPADM

## 2022-08-17 RX ORDER — SODIUM CHLORIDE, SODIUM LACTATE, POTASSIUM CHLORIDE, CALCIUM CHLORIDE 600; 310; 30; 20 MG/100ML; MG/100ML; MG/100ML; MG/100ML
125 INJECTION, SOLUTION INTRAVENOUS CONTINUOUS
Status: DISCONTINUED | OUTPATIENT
Start: 2022-08-17 | End: 2022-08-18 | Stop reason: HOSPADM

## 2022-08-17 RX ORDER — DEXAMETHASONE SODIUM PHOSPHATE 4 MG/ML
INJECTION, SOLUTION INTRA-ARTICULAR; INTRALESIONAL; INTRAMUSCULAR; INTRAVENOUS; SOFT TISSUE
Status: DISCONTINUED | OUTPATIENT
Start: 2022-08-17 | End: 2022-08-17

## 2022-08-17 RX ADMIN — EPHEDRINE SULFATE 25 MG: 50 INJECTION INTRAVENOUS at 09:08

## 2022-08-17 RX ADMIN — MIDAZOLAM HYDROCHLORIDE 2 MG: 1 INJECTION, SOLUTION INTRAMUSCULAR; INTRAVENOUS at 09:08

## 2022-08-17 RX ADMIN — ONDANSETRON 4 MG: 2 INJECTION INTRAMUSCULAR; INTRAVENOUS at 09:08

## 2022-08-17 RX ADMIN — FENTANYL CITRATE 50 MCG: 50 INJECTION, SOLUTION INTRAMUSCULAR; INTRAVENOUS at 09:08

## 2022-08-17 RX ADMIN — FENTANYL CITRATE 50 MCG: 50 INJECTION, SOLUTION INTRAMUSCULAR; INTRAVENOUS at 10:08

## 2022-08-17 RX ADMIN — SODIUM CHLORIDE, POTASSIUM CHLORIDE, SODIUM LACTATE AND CALCIUM CHLORIDE: 600; 310; 30; 20 INJECTION, SOLUTION INTRAVENOUS at 09:08

## 2022-08-17 RX ADMIN — EPHEDRINE SULFATE 25 MG: 50 INJECTION INTRAVENOUS at 10:08

## 2022-08-17 RX ADMIN — MORPHINE SULFATE 2 MG: 4 INJECTION, SOLUTION INTRAMUSCULAR; INTRAVENOUS at 11:08

## 2022-08-17 RX ADMIN — LIDOCAINE HYDROCHLORIDE 100 MG: 20 INJECTION, SOLUTION EPIDURAL; INFILTRATION; INTRACAUDAL; PERINEURAL at 09:08

## 2022-08-17 RX ADMIN — DEXAMETHASONE SODIUM PHOSPHATE 4 MG: 4 INJECTION, SOLUTION INTRAMUSCULAR; INTRAVENOUS at 09:08

## 2022-08-17 RX ADMIN — CEFAZOLIN SODIUM 2 G: 2 SOLUTION INTRAVENOUS at 09:08

## 2022-08-17 RX ADMIN — PROPOFOL 200 MG: 10 INJECTION, EMULSION INTRAVENOUS at 09:08

## 2022-08-17 RX ADMIN — KETOROLAC TROMETHAMINE 15 MG: 30 INJECTION, SOLUTION INTRAMUSCULAR; INTRAVENOUS at 11:08

## 2022-08-17 NOTE — DISCHARGE SUMMARY
Discharge Note        SUMMARY     Admit Date: 8/17/2022    Attending Physician: Terry Tinajero MD     Discharge Physician: Terry Tinajero MD    Discharge Date: 8/17/2022 10:46 AM      Hospital Course: Patient tolerated procedure well.     Disposition: Home or Self Care    Patient Instructions:   Current Discharge Medication List      START taking these medications    Details   ondansetron (ZOFRAN) 4 MG tablet Take 1 tablet (4 mg total) by mouth every 6 (six) hours as needed for Nausea.  Qty: 30 tablet, Refills: 0      oxyCODONE-acetaminophen (PERCOCET) 5-325 mg per tablet Take 1 tablet by mouth every 6 (six) hours as needed for Pain.  Qty: 30 tablet, Refills: 0    Comments: n/a              Discharge Procedure Orders (must include Diet, Follow-up, Activity):   Discharge Procedure Orders (must include Diet, Follow-up, Activity)   Diet general     Lifting restrictions   Order Comments: No heavy lifting, pushing, pulling, bending, strenuous exercise greater than 10 lb for the next 6 weeks.     Other restrictions (specify):   Order Comments: No swimming or submersion of wounds in lakes, ponds, streams, oceans, bathtubs, etc until seen in clinic for postoperative evaluation.     No dressing needed     Call MD for:  temperature >100.4     Call MD for:  persistent nausea and vomiting     Call MD for:  severe uncontrolled pain     Call MD for:  difficulty breathing, headache or visual disturbances     Call MD for:  redness, tenderness, or signs of infection (pain, swelling, redness, odor or green/yellow discharge around incision site)     Call MD for:  persistent dizziness or light-headedness        Follow Up:  Follow up as scheduled.  Resume routine diet.  Activity as tolerated.    No driving day of procedure.

## 2022-08-17 NOTE — TRANSFER OF CARE
Anesthesia Transfer of Care Note    Patient: Perez Kenyon    Procedure(s) Performed: Procedure(s) (LRB):  MASTECTOMY (N/A)  BIOPSY, LYMPH NODE, AXILLARY (N/A)    Patient location: PACU    Anesthesia Type: general    Transport from OR: Transported from OR on room air with adequate spontaneous ventilation    Post pain: adequate analgesia    Post assessment: no apparent anesthetic complications    Post vital signs: stable    Level of consciousness: sedated and responds to stimulation    Nausea/Vomiting: no nausea/vomiting    Complications: none    Transfer of care protocol was followed      Last vitals:   Visit Vitals  BP (!) 170/104   Pulse 70   Temp 36.7 °C (98.1 °F)   Resp 16   SpO2 98%

## 2022-08-17 NOTE — H&P
[facility] Surgery H&P Note    Subjective:       Patient ID: Perez Kenyon is a 72 y.o. male.    Chief Complaint:  Follow-up left chest pathology.  HPI:  Perez Kenyon is a 72 y.o. male history of no past medical problems presents today for follow-up evaluation after punch biopsy of left chest mass.  Biopsy revealed evidence of invasive ductal carcinoma ER, NH positive.  Her 2 Nu negative.  No clinical adenopathy in the axilla.  Patient desires to proceed with surgical intervention.    No past medical history on file.  Past Surgical History:   Procedure Laterality Date    ROTATOR CUFF REPAIR Right 2008     No family history on file.  Social History     Socioeconomic History    Marital status:    Occupational History    Occupation: retired   Tobacco Use    Smoking status: Never Smoker    Smokeless tobacco: Never Used   Substance and Sexual Activity    Alcohol use: Yes    Drug use: Not Currently    Sexual activity: Yes     Partners: Female       No current facility-administered medications for this encounter.     Review of patient's allergies indicates:  No Known Allergies    Review of Systems   Constitutional: Negative for appetite change, chills and fever.   HENT: Negative for congestion, dental problem and drooling.    Eyes: Negative for photophobia, discharge and itching.   Respiratory: Negative for apnea and chest tightness.    Cardiovascular: Negative for chest pain, palpitations and leg swelling.   Gastrointestinal: Negative for abdominal distention and abdominal pain.   Endocrine: Negative for cold intolerance and heat intolerance.   Genitourinary: Negative for difficulty urinating and dysuria.   Musculoskeletal: Negative for arthralgias and back pain.   Skin: Negative for color change and pallor.   Neurological: Negative for dizziness, facial asymmetry and headaches.   Hematological: Negative for adenopathy. Does not bruise/bleed easily.   Psychiatric/Behavioral: Negative for  agitation, behavioral problems and confusion.       Objective:      There were no vitals filed for this visit.  Physical Exam  Constitutional:       Appearance: He is well-developed. He is not diaphoretic.   HENT:      Head: Normocephalic and atraumatic.   Eyes:      Pupils: Pupils are equal, round, and reactive to light.   Neck:      Thyroid: No thyromegaly.   Cardiovascular:      Rate and Rhythm: Normal rate and regular rhythm.      Heart sounds: No murmur heard.  Pulmonary:      Effort: Pulmonary effort is normal. No respiratory distress.      Breath sounds: Normal breath sounds.   Chest:       Abdominal:      General: Bowel sounds are normal. There is no distension.      Palpations: Abdomen is soft.      Tenderness: There is no abdominal tenderness.   Musculoskeletal:         General: Normal range of motion.      Cervical back: Normal range of motion and neck supple.   Skin:     General: Skin is warm.      Capillary Refill: Capillary refill takes less than 2 seconds.      Findings: No erythema or rash.   Neurological:      Mental Status: He is alert and oriented to person, place, and time.      Cranial Nerves: No cranial nerve deficit.         Lab Review:   CBC:   Lab Results   Component Value Date    WBC 8.72 08/16/2022    RBC 5.14 08/16/2022    HGB 17.0 08/16/2022    HCT 49.6 08/16/2022     08/16/2022     BMP:   Lab Results   Component Value Date    GLU 91 07/18/2022     07/18/2022    K 5.2 (H) 07/18/2022     07/18/2022    CO2 26 07/18/2022    BUN 14 07/18/2022    CREATININE 1.1 07/18/2022    CALCIUM 9.5 07/18/2022     Diagnostics Review:  Pathology reviewed.  Invasive ductal carcinoma.  ER NC positive HER2/karthikeyan negative.       Medical Decision Making/Counseling:  Patient with left chest wall invasive ductal carcinoma.  Clinically negative nodes.  Options to include lumpectomy with radiation versus mastectomy have been discussed.  Lymph node sampling left side have been discussed.  After  informed discussion with the patient and family in clinic today, risk benefits alternatives etcetera, they voiced understanding of the options as well as risk benefits and desires to proceed with left mastectomy with axillary sampling.  All questions were answered to the patient and family's satisfaction.    Total clinic time today with patient, in face-to-face interaction was 45 min, of which greater than half of that time was spent in face-to-face counseling.    Patient instructed that best way to communicate with my office staff is for patient to get on the [facility] epic patient portal to expedite communication and communication issues that may occur.  Patient was given instructions on how to get on the portal.  I encouraged patient to obtain portal access as well.  Ultimately it is up to the patient to obtain access.  Patient voiced understanding.

## 2022-08-17 NOTE — PLAN OF CARE
Extensive education provided to family and caregiver regarding wound care and management of LOYD drain. Family able to return demonstrate.

## 2022-08-17 NOTE — ANESTHESIA POSTPROCEDURE EVALUATION
Anesthesia Post Evaluation    Patient: Perez Kenyon    Procedure(s) Performed: Procedure(s) (LRB):  MASTECTOMY (N/A)  BIOPSY, LYMPH NODE, AXILLARY (N/A)    Final Anesthesia Type: general      Patient location during evaluation: PACU  Patient participation: Yes- Able to Participate  Level of consciousness: awake and awake and alert  Post-procedure vital signs: reviewed and stable  Pain management: adequate  Airway patency: patent    PONV status at discharge: No PONV  Anesthetic complications: no      Cardiovascular status: blood pressure returned to baseline  Respiratory status: unassisted and spontaneous ventilation  Hydration status: euvolemic  Follow-up not needed.          Vitals Value Taken Time   /103 08/17/22 1216   Temp 36.7 °C (98 °F) 08/17/22 1051   Pulse 62 08/17/22 1220   Resp 14 08/17/22 1222   SpO2 96 % 08/17/22 1222   Vitals shown include unvalidated device data.      Event Time   Out of Recovery 12:06:00         Pain/Alexys Score: Pain Rating Prior to Med Admin: 6 (8/17/2022 11:31 AM)  Alexys Score: 10 (8/17/2022 11:50 AM)  Modified Alexys Score: 18 (8/17/2022 12:05 PM)

## 2022-08-23 ENCOUNTER — OFFICE VISIT (OUTPATIENT)
Dept: SURGERY | Facility: CLINIC | Age: 72
End: 2022-08-23
Payer: MEDICARE

## 2022-08-23 VITALS
HEART RATE: 73 BPM | SYSTOLIC BLOOD PRESSURE: 149 MMHG | DIASTOLIC BLOOD PRESSURE: 84 MMHG | WEIGHT: 176 LBS | OXYGEN SATURATION: 98 % | HEIGHT: 72 IN | BODY MASS INDEX: 23.84 KG/M2

## 2022-08-23 DIAGNOSIS — Z09 POSTOP CHECK: Primary | ICD-10-CM

## 2022-08-23 PROCEDURE — 99999 PR PBB SHADOW E&M-EST. PATIENT-LVL III: CPT | Mod: PBBFAC,,, | Performed by: SURGERY

## 2022-08-23 PROCEDURE — 99213 OFFICE O/P EST LOW 20 MIN: CPT | Mod: PBBFAC | Performed by: SURGERY

## 2022-08-23 PROCEDURE — 99024 POSTOP FOLLOW-UP VISIT: CPT | Mod: POP,,, | Performed by: SURGERY

## 2022-08-23 PROCEDURE — 99024 PR POST-OP FOLLOW-UP VISIT: ICD-10-PCS | Mod: POP,,, | Performed by: SURGERY

## 2022-08-23 PROCEDURE — 99999 PR PBB SHADOW E&M-EST. PATIENT-LVL III: ICD-10-PCS | Mod: PBBFAC,,, | Performed by: SURGERY

## 2022-08-23 NOTE — PROGRESS NOTES
General Surgery  [facility]  Follow-up    HPI/Follow-up exam:  Perez Kenyon is a 72 y.o. male presents today for follow-up examination after left mastectomy with axillary node excision.  Patient since surgeries done well.  No apparent postsurgical issues.  LOYD drain with greater than 30 cc serosanguineous fluid output.  Some bruising in the left axilla to be expected.  Surgical site clean dry intact no signs or symptoms of infection.  No other new issues or complaints.    PHYSICAL EXAM:  BP (!) 149/84 (BP Location: Left arm, Patient Position: Sitting, BP Method: Medium (Automatic))   Pulse 73   Ht 6' (1.829 m)   Wt 79.8 kg (176 lb)   SpO2 98%   BMI 23.87 kg/m²   Gen: Wd Wn male in NAD  Heent: Nc/At, MMM  Cv: RRR  Lung: Non-labored breathing, clear bilaterally  Abd: Soft, non-tender, non-distended  Ext: No cyanosis clubbing or edema  Left chest site no signs or symptoms of infection.  Minimal swelling.  LOYD drain with serosanguineous output.    Pathology:  Final pathology pending.    Assessment:  Perez Kenyon is a 72 y.o. male s/p left mastectomy with axillary lymph node sampling    Plan/Medical Decision Making:  Doing well.  No issues.  Continue current therapy.  Wrap chest with Ace wrap every 12-24 hours.  Keep LOYD drain.  Follow-up surgery clinic 1 week.    Followup:  1 week.    Patient instructed that best way to communicate with my office staff is for patient to get on the [facility] epic patient portal to expedite communication and communication issues that may occur.  Patient was given instructions on how to get on the portal.  I encouraged patient to obtain portal access as well.  Ultimately it is up to the patient to obtain access.  Patient voiced understanding.

## 2022-09-01 ENCOUNTER — OFFICE VISIT (OUTPATIENT)
Dept: SURGERY | Facility: CLINIC | Age: 72
End: 2022-09-01
Payer: MEDICARE

## 2022-09-01 DIAGNOSIS — C50.922 MALIGNANT NEOPLASM OF LEFT BREAST IN MALE, ESTROGEN RECEPTOR POSITIVE, UNSPECIFIED SITE OF BREAST: Primary | ICD-10-CM

## 2022-09-01 DIAGNOSIS — Z17.0 MALIGNANT NEOPLASM OF LEFT BREAST IN MALE, ESTROGEN RECEPTOR POSITIVE, UNSPECIFIED SITE OF BREAST: Primary | ICD-10-CM

## 2022-09-01 PROCEDURE — 99024 POSTOP FOLLOW-UP VISIT: CPT | Mod: POP,,, | Performed by: SURGERY

## 2022-09-01 PROCEDURE — 99212 OFFICE O/P EST SF 10 MIN: CPT | Mod: PBBFAC | Performed by: SURGERY

## 2022-09-01 PROCEDURE — 99999 PR PBB SHADOW E&M-EST. PATIENT-LVL II: CPT | Mod: PBBFAC,,, | Performed by: SURGERY

## 2022-09-01 PROCEDURE — 99024 PR POST-OP FOLLOW-UP VISIT: ICD-10-PCS | Mod: POP,,, | Performed by: SURGERY

## 2022-09-01 PROCEDURE — 99999 PR PBB SHADOW E&M-EST. PATIENT-LVL II: ICD-10-PCS | Mod: PBBFAC,,, | Performed by: SURGERY

## 2022-09-01 NOTE — PROGRESS NOTES
General Surgery  [facility]  Follow-up    HPI/Follow-up exam:  Perez Kenyon is a 72 y.o. 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  Continues to have 50-60 cc serous output through LOYD drain.  Patient doing well today.  No new issues or complaints.    PHYSICAL EXAM:  There were no vitals taken for this visit.  Gen: Wd Wn male in NAD  Heent: Nc/At, MMM  Cv: RRR  Lung: Non-labored breathing, clear bilaterally  Abd: Soft, non-tender, non-distended  Ext: No cyanosis clubbing or edema  Surgical site clean dry intact no signs or symptoms of infection.  LOYD drain with serous output.    Pathology:  T1b N0 mucinous carcinoma left breast.  All margins negative except skin margin near nipple, however significant amount of tissue, skin, was removed superior inferior medial lateral etcetera at this site which was all negative.    Assessment:  Perez Kenyon is a 72 y.o. male s/p left chest wall resection for carcinoma.    Plan/Medical Decision Making:  Doing well.  Continue LOYD drain.  Desires follow-up with MD Ravi for Oncology.  Will arrange.  Follow-up surgery clinic 1 week.    Followup:  1 week.    Patient instructed that best way to communicate with my office staff is for patient to get on the [facility] patient portal to expedite communication and communication issues that may occur.  Patient was given instructions on how to get on the portal.  I encouraged patient to obtain portal access as well.  Ultimately it is up to the patient to obtain access.  Patient voiced understanding.

## 2022-09-08 ENCOUNTER — OFFICE VISIT (OUTPATIENT)
Dept: SURGERY | Facility: CLINIC | Age: 72
End: 2022-09-08
Payer: MEDICARE

## 2022-09-08 VITALS
OXYGEN SATURATION: 96 % | HEART RATE: 59 BPM | WEIGHT: 176 LBS | DIASTOLIC BLOOD PRESSURE: 86 MMHG | BODY MASS INDEX: 23.84 KG/M2 | SYSTOLIC BLOOD PRESSURE: 165 MMHG | HEIGHT: 72 IN

## 2022-09-08 DIAGNOSIS — Z09 POSTOP CHECK: Primary | ICD-10-CM

## 2022-09-08 PROCEDURE — 99024 PR POST-OP FOLLOW-UP VISIT: ICD-10-PCS | Mod: POP,,, | Performed by: SURGERY

## 2022-09-08 PROCEDURE — 99999 PR PBB SHADOW E&M-EST. PATIENT-LVL III: ICD-10-PCS | Mod: PBBFAC,,, | Performed by: SURGERY

## 2022-09-08 PROCEDURE — 99024 POSTOP FOLLOW-UP VISIT: CPT | Mod: POP,,, | Performed by: SURGERY

## 2022-09-08 PROCEDURE — 99999 PR PBB SHADOW E&M-EST. PATIENT-LVL III: CPT | Mod: PBBFAC,,, | Performed by: SURGERY

## 2022-09-08 PROCEDURE — 99213 OFFICE O/P EST LOW 20 MIN: CPT | Mod: PBBFAC | Performed by: SURGERY

## 2022-09-08 NOTE — PROGRESS NOTES
Doing well postop.  Surgical site looks great.  LOYD drain still placing output of 45-50 cc per day serous fluid.  No new issues or complaints.  Follow-up 1 week.  Maintain drain.

## 2022-09-15 ENCOUNTER — OFFICE VISIT (OUTPATIENT)
Dept: SURGERY | Facility: CLINIC | Age: 72
End: 2022-09-15
Payer: MEDICARE

## 2022-09-15 VITALS
OXYGEN SATURATION: 96 % | SYSTOLIC BLOOD PRESSURE: 162 MMHG | HEART RATE: 61 BPM | HEIGHT: 72 IN | DIASTOLIC BLOOD PRESSURE: 89 MMHG | RESPIRATION RATE: 17 BRPM | WEIGHT: 175 LBS | BODY MASS INDEX: 23.7 KG/M2

## 2022-09-15 DIAGNOSIS — Z09 POSTOP CHECK: Primary | ICD-10-CM

## 2022-09-15 PROCEDURE — 99999 PR PBB SHADOW E&M-EST. PATIENT-LVL III: CPT | Mod: PBBFAC,,, | Performed by: SURGERY

## 2022-09-15 PROCEDURE — 99213 OFFICE O/P EST LOW 20 MIN: CPT | Mod: PBBFAC | Performed by: SURGERY

## 2022-09-15 PROCEDURE — 99999 PR PBB SHADOW E&M-EST. PATIENT-LVL III: ICD-10-PCS | Mod: PBBFAC,,, | Performed by: SURGERY

## 2022-09-15 PROCEDURE — 99024 PR POST-OP FOLLOW-UP VISIT: ICD-10-PCS | Mod: POP,,, | Performed by: SURGERY

## 2022-09-15 PROCEDURE — 99024 POSTOP FOLLOW-UP VISIT: CPT | Mod: POP,,, | Performed by: SURGERY

## 2022-09-15 NOTE — PROGRESS NOTES
Doing well today.  No issues.  LOYD drain was less than 5 cc output a day.  Removed today.  Ace wrap placed.  Utilize Ace wrap every 24 hours for the next week to decrease chance of seroma formation.  Follow-up surgery clinic 2 weeks.

## 2022-09-29 ENCOUNTER — OFFICE VISIT (OUTPATIENT)
Dept: SURGERY | Facility: CLINIC | Age: 72
End: 2022-09-29
Payer: MEDICARE

## 2022-09-29 VITALS
SYSTOLIC BLOOD PRESSURE: 181 MMHG | DIASTOLIC BLOOD PRESSURE: 92 MMHG | WEIGHT: 176 LBS | OXYGEN SATURATION: 97 % | HEART RATE: 53 BPM | HEIGHT: 72 IN | BODY MASS INDEX: 23.84 KG/M2

## 2022-09-29 DIAGNOSIS — Z09 POSTOP CHECK: Primary | ICD-10-CM

## 2022-09-29 PROCEDURE — 99213 OFFICE O/P EST LOW 20 MIN: CPT | Mod: PBBFAC | Performed by: SURGERY

## 2022-09-29 PROCEDURE — 99024 POSTOP FOLLOW-UP VISIT: CPT | Mod: POP,,, | Performed by: SURGERY

## 2022-09-29 PROCEDURE — 99999 PR PBB SHADOW E&M-EST. PATIENT-LVL III: CPT | Mod: PBBFAC,,, | Performed by: SURGERY

## 2022-09-29 PROCEDURE — 99999 PR PBB SHADOW E&M-EST. PATIENT-LVL III: ICD-10-PCS | Mod: PBBFAC,,, | Performed by: SURGERY

## 2022-09-29 PROCEDURE — 99024 PR POST-OP FOLLOW-UP VISIT: ICD-10-PCS | Mod: POP,,, | Performed by: SURGERY

## 2022-09-29 NOTE — PROGRESS NOTES
Doing well post mastectomy.  No issues.  Return to normal activity.  F/u after first of the year.

## 2022-10-13 ENCOUNTER — TELEPHONE (OUTPATIENT)
Dept: SURGERY | Facility: CLINIC | Age: 72
End: 2022-10-13
Payer: MEDICARE

## 2022-10-13 ENCOUNTER — PATIENT MESSAGE (OUTPATIENT)
Dept: SURGERY | Facility: CLINIC | Age: 72
End: 2022-10-13
Payer: MEDICARE

## 2022-10-13 NOTE — TELEPHONE ENCOUNTER
----- Message from Ace Young sent at 10/13/2022 10:17 AM CDT -----  Type: Patient Call Back         Who called: Pt          What is the request in detail:  Pt states that something is not right with the mastectomy cont care drain removal . And needs to be seen by           Can the clinic reply by [facility]? No          Would the patient rather a call back or a response via [facility]? Call back          Best call back number:[phone] (mobile)          Additional Information:           Thank You

## 2022-10-13 NOTE — TELEPHONE ENCOUNTER
Called pt x 4. LVM and sent patient a portal message to come to clinic today before 3 pm to be seen by Dr Tinajero.

## 2022-10-21 ENCOUNTER — OFFICE VISIT (OUTPATIENT)
Dept: SURGERY | Facility: CLINIC | Age: 72
End: 2022-10-21
Payer: MEDICARE

## 2022-10-21 VITALS
OXYGEN SATURATION: 98 % | RESPIRATION RATE: 17 BRPM | WEIGHT: 180 LBS | DIASTOLIC BLOOD PRESSURE: 91 MMHG | SYSTOLIC BLOOD PRESSURE: 163 MMHG | HEIGHT: 72 IN | HEART RATE: 59 BPM | BODY MASS INDEX: 24.38 KG/M2

## 2022-10-21 DIAGNOSIS — Z09 POSTOP CHECK: Primary | ICD-10-CM

## 2022-10-21 PROCEDURE — 99999 PR PBB SHADOW E&M-EST. PATIENT-LVL III: ICD-10-PCS | Mod: PBBFAC,,, | Performed by: SURGERY

## 2022-10-21 PROCEDURE — 99024 PR POST-OP FOLLOW-UP VISIT: ICD-10-PCS | Mod: POP,,, | Performed by: SURGERY

## 2022-10-21 PROCEDURE — 99999 PR PBB SHADOW E&M-EST. PATIENT-LVL III: CPT | Mod: PBBFAC,,, | Performed by: SURGERY

## 2022-10-21 PROCEDURE — 99024 POSTOP FOLLOW-UP VISIT: CPT | Mod: POP,,, | Performed by: SURGERY

## 2022-10-21 PROCEDURE — 99213 OFFICE O/P EST LOW 20 MIN: CPT | Mod: PBBFAC | Performed by: SURGERY

## 2022-10-21 NOTE — PROGRESS NOTES
Patient is status post left mastectomy.  Doing well.  There is a site lateral aspect of his incision that has a mild dog-ear.  Additionally maybe a stitch granuloma at this site.  Resolved.  No redness.  Recommendation be repeat evaluation 6 weeks.  Possible excision elliptical this area if continues to cause troubles.

## 2022-11-09 ENCOUNTER — OFFICE VISIT (OUTPATIENT)
Dept: HEMATOLOGY/ONCOLOGY | Facility: CLINIC | Age: 72
End: 2022-11-09
Payer: MEDICARE

## 2022-11-09 VITALS
TEMPERATURE: 99 F | HEIGHT: 72 IN | HEART RATE: 57 BPM | RESPIRATION RATE: 18 BRPM | SYSTOLIC BLOOD PRESSURE: 171 MMHG | BODY MASS INDEX: 24.42 KG/M2 | WEIGHT: 180.31 LBS | DIASTOLIC BLOOD PRESSURE: 84 MMHG

## 2022-11-09 DIAGNOSIS — Z17.0 MALIGNANT NEOPLASM OF NIPPLE OF LEFT BREAST IN MALE, ESTROGEN RECEPTOR POSITIVE: Primary | ICD-10-CM

## 2022-11-09 DIAGNOSIS — C50.022 MALIGNANT NEOPLASM OF NIPPLE OF LEFT BREAST IN MALE, ESTROGEN RECEPTOR POSITIVE: Primary | ICD-10-CM

## 2022-11-09 PROCEDURE — 99204 PR OFFICE/OUTPT VISIT, NEW, LEVL IV, 45-59 MIN: ICD-10-PCS | Mod: S$GLB,,, | Performed by: INTERNAL MEDICINE

## 2022-11-09 PROCEDURE — 99204 OFFICE O/P NEW MOD 45 MIN: CPT | Mod: S$GLB,,, | Performed by: INTERNAL MEDICINE

## 2022-11-09 RX ORDER — FAMOTIDINE 20 MG/1
20 TABLET, FILM COATED ORAL DAILY PRN
COMMUNITY
Start: 2022-06-02

## 2022-11-09 NOTE — LETTER
November 20, 2022        Alton Maguire MD  [address]  [facility] [place] [address]             [facility] - Hematology Oncology  [address] [facility] [address]  Phone: [phone]  Fax: [phone]   Patient: Perez Kenyon   MR Number: [id number]   YOB: 1950   Date of Visit: 11/9/2022       Dear Dr. Maguire:    Thank you for referring Perez Kenyon to me for evaluation. Below are the relevant portions of my assessment and plan of care.            If you have questions, please do not hesitate to call me. I look forward to following Perez along with you.    Sincerely,      MYRON Barraza MD           CC    No Recipients

## 2022-11-20 ENCOUNTER — TELEPHONE (OUTPATIENT)
Dept: HEMATOLOGY/ONCOLOGY | Facility: CLINIC | Age: 72
End: 2022-11-20

## 2022-11-20 NOTE — PROGRESS NOTES
[facility] Hematology Oncology In Office Initial Encounter Note    11/9/22    Subjective:      Patient ID:   Perez Kenyon  72 y.o. male  1950  MD Terry Turner MD    Chief Complaint:   Breast cancer    HPI:  72 y.o. male found lump at L breast below the nipple area.  7/12/22.  Mamm 2.3 cm mass at retroareolar area.  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  ERP+, PRP+, H2N neg. Ki-67 10%.  L modified radical mastectomy 8/17/22   Invasive ductal carcinoma, 2.1 cm, grade 2.  DCIS +.  Invasive carcinoma goes into epidermis, skin, but with out ulceration of nipple.  Surgical margins are clear. 0/8 axillary LN are positive.    He has met with Dr. Madelin Arteaga of [facility]., male breast cancer specialist.    R rotator cuff surgery 2008.  Last colonoscopy 11/11/20, Dr. Park.    Smoke no, ETOH yes, Allergy no.    Meds pepcid.    Mom CVA, no cancer.  Dad prostate cancer.  1/2 Brother & 1/2 Brother A & W.  Son & Daughter A & W.    ROS:   GEN: normal without any fever, night sweats or weight loss  HEENT: normal with no HA's, sore throat, stiff neck, changes in vision  CV: normal with no CP, SOB, PND, PRADO or orthopnea  PULM: normal with no SOB, cough, hemoptysis, sputum or pleuritic pain  GI: normal with no abdominal pain, nausea, vomiting, constipation, diarrhea, melanotic stools, BRBPR, or hematemesis  : normal with no hematuria, dysuria  BREAST: See HPI  SKIN: normal with no rash, erythema, bruising, or swelling     Past Medical History:   Diagnosis Date    Cancer     Breast     Past Surgical History:   Procedure Laterality Date    BIOPSY OF AXILLARY NODE N/A 8/17/2022    Procedure: BIOPSY, LYMPH NODE, AXILLARY;  Surgeon: Terry Tinajero MD;  Location: [facility] OR;  Service: General;  Laterality: N/A;    MASTECTOMY N/A 8/17/2022    Procedure: MASTECTOMY;  Surgeon: Terry Tinajero MD;  Location: [facility] OR;  Service: General;   Laterality: N/A;  1) left mastectomy with axillary node dissection 19307  2) injection of Lymphazurin blue dye left chest    ROTATOR CUFF REPAIR Right 2008       Review of patient's allergies indicates:  No Known Allergies  Social History     Socioeconomic History    Marital status:    Occupational History    Occupation: retired   Tobacco Use    Smoking status: Never    Smokeless tobacco: Never   Substance and Sexual Activity    Alcohol use: Yes    Drug use: Not Currently    Sexual activity: Yes     Partners: Female         Current Outpatient Medications:     famotidine (PEPCID) 20 MG tablet, Take 20 mg by mouth daily as needed., Disp: , Rfl:           Objective:   Vitals:  Blood pressure (!) 171/84, pulse (!) 57, temperature 99.3 °F (37.4 °C), resp. rate 18, height 6' (1.829 m), weight 81.8 kg (180 lb 4.8 oz).    Physical Examination:   GEN: no apparent distress, comfortable  HEAD: atraumatic and normocephalic  EYES: no pallor, no icterus  ENT: no pharyngeal erythema, external ears WNL; no nasal discharge  NECK: no masses, thyroid normal, trachea midline, no LAD/LN's, supple  CV: RRR with no murmur; normal pulse; normal S1 and S2; no pedal edema  CHEST: Normal respiratory effort; CTAB; normal breath sounds; no wheeze or crackles  ABDOM: nontender and nondistended; soft; no rebound/guarding, L/S NP  MUSC/Skeletal: ROM normal; no crepitus; joints normal  EXTREM: no clubbing, cyanosis, inflammation or swelling  SKIN: no rashes, lesions, ulcers, petechiae  : no cvat  NEURO: grossly intact; motor/sensory WNL; no tremors  PSYCH: normal mood, affect and behavior  LYMPH: normal cervical, supraclavicular, axillary LN's  BREASTS: L chest NT, incisions closed, no palpable mass.  R breast NT, no palpable mass.      Labs:   Lab Results   Component Value Date    WBC 8.72 08/16/2022    HGB 17.0 08/16/2022    HCT 49.6 08/16/2022    MCV 97 08/16/2022     08/16/2022    CMP  Sodium   Date Value Ref Range Status    07/18/2022 140 136 - 145 mmol/L Final     Potassium   Date Value Ref Range Status   07/18/2022 5.2 (H) 3.5 - 5.1 mmol/L Final     Chloride   Date Value Ref Range Status   07/18/2022 105 95 - 110 mmol/L Final     CO2   Date Value Ref Range Status   07/18/2022 26 23 - 29 mmol/L Final     Glucose   Date Value Ref Range Status   07/18/2022 91 70 - 110 mg/dL Final     BUN   Date Value Ref Range Status   07/18/2022 14 8 - 23 mg/dL Final     Creatinine   Date Value Ref Range Status   07/18/2022 1.1 0.5 - 1.4 mg/dL Final     Calcium   Date Value Ref Range Status   07/18/2022 9.5 8.7 - 10.5 mg/dL Final     Total Protein   Date Value Ref Range Status   07/18/2022 7.0 6.0 - 8.4 g/dL Final     Albumin   Date Value Ref Range Status   07/18/2022 4.3 3.5 - 5.2 g/dL Final     Total Bilirubin   Date Value Ref Range Status   07/18/2022 1.0 0.1 - 1.0 mg/dL Final     Comment:     For infants and newborns, interpretation of results should be based  on gestational age, weight and in agreement with clinical  observations.    Premature Infant recommended reference ranges:  Up to 24 hours.............<8.0 mg/dL  Up to 48 hours............<12.0 mg/dL  3-5 days..................<15.0 mg/dL  6-29 days.................<15.0 mg/dL       Alkaline Phosphatase   Date Value Ref Range Status   07/18/2022 71 55 - 135 U/L Final     AST   Date Value Ref Range Status   07/18/2022 24 10 - 40 U/L Final     ALT   Date Value Ref Range Status   07/18/2022 23 10 - 44 U/L Final     Anion Gap   Date Value Ref Range Status   07/18/2022 9 8 - 16 mmol/L Final     eGFR if    Date Value Ref Range Status   07/18/2022 >60.0 >60 mL/min/1.73 m^2 Final     eGFR if non    Date Value Ref Range Status   07/18/2022 >60.0 >60 mL/min/1.73 m^2 Final     Comment:     Calculation used to obtain the estimated glomerular filtration  rate (eGFR) is the CKD-EPI equation.        PSA 1.85  TSH 2.1    Assessment:   (1) 72 y.o. 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  ERP+, PRP+, H2N neg.  Local invasion into L breast nipple epidermis or skin without ulceration.    (2)Oncotype dx testing to assess endocrine and chemotherapy adjuvant benefit in reducing recurrence risk.    (3)BrCa1&2 testing for male breast cancer.    RTC 1 month.

## 2022-11-23 LAB
COMMENT: NORMAL
FINAL PATHOLOGIC DIAGNOSIS: NORMAL
GROSS: NORMAL
Lab: NORMAL
MICROSCOPIC EXAM: NORMAL
SUPPLEMENTAL DIAGNOSIS: NORMAL

## 2022-12-07 ENCOUNTER — HOSPITAL ENCOUNTER (OUTPATIENT)
Dept: RADIOLOGY | Facility: HOSPITAL | Age: 72
Discharge: HOME | End: 2022-12-07
Attending: FAMILY MEDICINE
Payer: MEDICARE

## 2022-12-07 DIAGNOSIS — N63.0 NODULE OF SKIN OF BREAST: Primary | ICD-10-CM

## 2022-12-07 DIAGNOSIS — R05.9 COUGH: Primary | ICD-10-CM

## 2022-12-07 DIAGNOSIS — N63.0 NODULE OF SKIN OF BREAST: ICD-10-CM

## 2022-12-07 DIAGNOSIS — R05.9 COUGH: ICD-10-CM

## 2022-12-07 PROCEDURE — 71046 X-RAY EXAM CHEST 2 VIEWS: CPT | Mod: 26,76,, | Performed by: RADIOLOGY

## 2022-12-07 PROCEDURE — 71046 XR CHEST PA AND LATERAL: ICD-10-PCS | Mod: 26,,, | Performed by: RADIOLOGY

## 2022-12-07 PROCEDURE — 71046 X-RAY EXAM CHEST 2 VIEWS: CPT | Mod: 26,,, | Performed by: RADIOLOGY

## 2022-12-07 PROCEDURE — 71046 X-RAY EXAM CHEST 2 VIEWS: CPT | Mod: TC

## 2022-12-07 PROCEDURE — 71046 XR CHEST PA AND LATERAL: ICD-10-PCS | Mod: 26,76,, | Performed by: RADIOLOGY

## 2022-12-21 ENCOUNTER — TELEPHONE (OUTPATIENT)
Dept: HEMATOLOGY/ONCOLOGY | Facility: HOSPITAL | Age: 72
End: 2022-12-21

## 2022-12-21 ENCOUNTER — OFFICE VISIT (OUTPATIENT)
Dept: HEMATOLOGY/ONCOLOGY | Facility: CLINIC | Age: 72
End: 2022-12-21
Payer: MEDICARE

## 2022-12-21 VITALS
RESPIRATION RATE: 18 BRPM | HEART RATE: 62 BPM | TEMPERATURE: 98 F | SYSTOLIC BLOOD PRESSURE: 192 MMHG | WEIGHT: 178.88 LBS | DIASTOLIC BLOOD PRESSURE: 92 MMHG | HEIGHT: 72 IN | BODY MASS INDEX: 24.23 KG/M2

## 2022-12-21 DIAGNOSIS — C50.022 MALIGNANT NEOPLASM OF NIPPLE OF LEFT BREAST IN MALE, ESTROGEN RECEPTOR POSITIVE: Primary | ICD-10-CM

## 2022-12-21 DIAGNOSIS — Z17.0 MALIGNANT NEOPLASM OF NIPPLE OF LEFT BREAST IN MALE, ESTROGEN RECEPTOR POSITIVE: Primary | ICD-10-CM

## 2022-12-21 PROCEDURE — 99214 PR OFFICE/OUTPT VISIT, EST, LEVL IV, 30-39 MIN: ICD-10-PCS | Mod: S$GLB,,, | Performed by: INTERNAL MEDICINE

## 2022-12-21 PROCEDURE — 99214 OFFICE O/P EST MOD 30 MIN: CPT | Mod: S$GLB,,, | Performed by: INTERNAL MEDICINE

## 2022-12-21 RX ORDER — OMEPRAZOLE 40 MG/1
40 CAPSULE, DELAYED RELEASE ORAL
COMMUNITY
Start: 2022-06-02

## 2022-12-21 RX ORDER — AZITHROMYCIN 250 MG/1
TABLET, FILM COATED ORAL
COMMUNITY
Start: 2022-12-07

## 2022-12-21 RX ORDER — LEVOFLOXACIN 750 MG/1
750 TABLET ORAL
COMMUNITY
Start: 2022-12-19

## 2022-12-22 NOTE — PROGRESS NOTES
[facility] Hematology Oncology In Office Subsequent Encounter Note    22    Subjective:      Patient ID:   Perez Kenyon  72 y.o. male  1950  MD Terry Turner MD Sharon Giordano, MD Angela Winfield, MD    Chief Complaint:   Breast cancer    HPI:  72 y.o. male found lump at L breast below the nipple area.  22.  Mamm 2.3 cm mass at retroareolar area.  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  ERP+, PRP+, H2N neg. Ki-67 10%.  L modified radical mastectomy 22   Invasive ductal carcinoma, 2.1 cm, grade 2.  DCIS +.  Invasive carcinoma goes into epidermis, skin, but with out ulceration of nipple.  Surgical margins are clear. 0/8 axillary LN are positive.    He has met with Dr. Madelin Arteaga of [facility]., male breast cancer specialist.    Stage 2 Dx by primary size, LN negative.    Oncotype Dx test supports 19% recurrence at 9 years from Dx, when treated with Tamoxifen or Arimidex adjuvantly.  Recurrence Score was 30.  Oncotype Dx supported that adjuvant chemotherapy in addition to Endocrine or hormonal therapy would decrease recurrence risk further.  I would estimate down to 12-13%  Over 9 years of follow up.    I discussed adjuvant Tamoxifen or Arimidex with him x's 5-10 years, and adjuvant chemotherapy trial q 3 weeks x's 4-6 cycles to try decrease RR per Oncotype Dx reports.  He has decided to go with observation now.  He does not agree to adjuvant hormonal, endocrine or adjuvant chemotherapy Rx.    He has male breast cancer.  BRCA 1 & 2 are negative.  But he is POSITIVE for CHEK2 gene mutation.  Risk of breast cancer may be as high as 20-40% in females.  Also 5-10% risk of colon cancer.  Some studies support increased risk of melanoma and prostate cancer.    He is not interested in prophylactic mastectomy at the other breast.  His parents are . He has no siblings.  He has a son and 3 grand daughters.   The son can have him self checked to see if he has CHEK2 mutation  Passed down to himself.  I think a 50/50% chance of passing this to his son.    Check Mamm and MRI 3/2023.    He wants to defer CT of CAP, bone scan or PET scan for now, he will think on it.      R rotator cuff surgery 2008.  Last colonoscopy 11/11/20, Dr. Park.    Smoke no, ETOH yes, Allergy no.    Meds pepcid.    Mom CVA, no cancer.  Dad prostate cancer.  1/2 Brother & 1/2 Brother A & W.  Son & Daughter A & W.    ROS:   GEN: normal without any fever, night sweats or weight loss  HEENT: normal with no HA's, sore throat, stiff neck, changes in vision  CV: normal with no CP, SOB, PND, PRADO or orthopnea  PULM: normal with no SOB, cough, hemoptysis, sputum or pleuritic pain  GI: normal with no abdominal pain, nausea, vomiting, constipation, diarrhea, melanotic stools, BRBPR, or hematemesis  : normal with no hematuria, dysuria  BREAST: See HPI  SKIN: normal with no rash, erythema, bruising, or swelling     Past Medical History:   Diagnosis Date    Cancer     Breast     Past Surgical History:   Procedure Laterality Date    BIOPSY OF AXILLARY NODE N/A 8/17/2022    Procedure: BIOPSY, LYMPH NODE, AXILLARY;  Surgeon: Terry Tinajero MD;  Location: [facility] OR;  Service: General;  Laterality: N/A;    MASTECTOMY N/A 8/17/2022    Procedure: MASTECTOMY;  Surgeon: Terry Tinajero MD;  Location: [facility] OR;  Service: General;  Laterality: N/A;  1) left mastectomy with axillary node dissection 19307  2) injection of Lymphazurin blue dye left chest    ROTATOR CUFF REPAIR Right 2008       Review of patient's allergies indicates:  No Known Allergies  Social History     Socioeconomic History    Marital status:    Occupational History    Occupation: retired   Tobacco Use    Smoking status: Never    Smokeless tobacco: Never   Substance and Sexual Activity    Alcohol use: Yes    Drug use: Not Currently    Sexual activity: Yes     Partners: Female          Current Outpatient Medications:     azithromycin (Z-MECHELLE) 250 MG tablet, Take by mouth., Disp: , Rfl:     famotidine (PEPCID) 20 MG tablet, Take 20 mg by mouth daily as needed., Disp: , Rfl:     levoFLOXacin (LEVAQUIN) 750 MG tablet, Take 750 mg by mouth., Disp: , Rfl:     omeprazole (PRILOSEC) 40 MG capsule, Take 40 mg by mouth., Disp: , Rfl:           Objective:   Vitals:  Blood pressure (!) 192/92, pulse 62, temperature 97.8 °F (36.6 °C), resp. rate 18, height 6' (1.829 m), weight 81.1 kg (178 lb 14.4 oz).    Physical Examination:   GEN: no apparent distress, comfortable  HEAD: atraumatic and normocephalic  EYES: no pallor, no icterus  ENT: no pharyngeal erythema, external ears WNL; no nasal discharge  NECK: no masses, thyroid normal, trachea midline, no LAD/LN's, supple  CV: RRR with no murmur; normal pulse; normal S1 and S2; no pedal edema  CHEST: Normal respiratory effort; CTAB; normal breath sounds; no wheeze or crackles  ABDOM: nontender and nondistended; soft; no rebound/guarding, L/S NP  MUSC/Skeletal: ROM normal; no crepitus; joints normal  EXTREM: no clubbing, cyanosis, inflammation or swelling  SKIN: no rashes, lesions, ulcers, petechiae  : no cvat  NEURO: grossly intact; motor/sensory WNL; no tremors  PSYCH: normal mood, affect and behavior  LYMPH: normal cervical, supraclavicular, axillary LN's  BREASTS: L chest NT, incisions closed, no palpable mass.  R breast NT, no palpable mass.      Labs:   Lab Results   Component Value Date    WBC 6.21 12/07/2022    HGB 16.9 12/07/2022    HCT 49.7 12/07/2022    MCV 96 12/07/2022     12/07/2022    CMP  Sodium   Date Value Ref Range Status   07/18/2022 140 136 - 145 mmol/L Final     Potassium   Date Value Ref Range Status   07/18/2022 5.2 (H) 3.5 - 5.1 mmol/L Final     Chloride   Date Value Ref Range Status   07/18/2022 105 95 - 110 mmol/L Final     CO2   Date Value Ref Range Status   07/18/2022 26 23 - 29 mmol/L Final     Glucose   Date Value Ref  Range Status   07/18/2022 91 70 - 110 mg/dL Final     BUN   Date Value Ref Range Status   07/18/2022 14 8 - 23 mg/dL Final     Creatinine   Date Value Ref Range Status   07/18/2022 1.1 0.5 - 1.4 mg/dL Final     Calcium   Date Value Ref Range Status   07/18/2022 9.5 8.7 - 10.5 mg/dL Final     Total Protein   Date Value Ref Range Status   07/18/2022 7.0 6.0 - 8.4 g/dL Final     Albumin   Date Value Ref Range Status   07/18/2022 4.3 3.5 - 5.2 g/dL Final     Total Bilirubin   Date Value Ref Range Status   07/18/2022 1.0 0.1 - 1.0 mg/dL Final     Comment:     For infants and newborns, interpretation of results should be based  on gestational age, weight and in agreement with clinical  observations.    Premature Infant recommended reference ranges:  Up to 24 hours.............<8.0 mg/dL  Up to 48 hours............<12.0 mg/dL  3-5 days..................<15.0 mg/dL  6-29 days.................<15.0 mg/dL       Alkaline Phosphatase   Date Value Ref Range Status   07/18/2022 71 55 - 135 U/L Final     AST   Date Value Ref Range Status   07/18/2022 24 10 - 40 U/L Final     ALT   Date Value Ref Range Status   07/18/2022 23 10 - 44 U/L Final     Anion Gap   Date Value Ref Range Status   07/18/2022 9 8 - 16 mmol/L Final     eGFR if    Date Value Ref Range Status   07/18/2022 >60.0 >60 mL/min/1.73 m^2 Final     eGFR if non    Date Value Ref Range Status   07/18/2022 >60.0 >60 mL/min/1.73 m^2 Final     Comment:     Calculation used to obtain the estimated glomerular filtration  rate (eGFR) is the CKD-EPI equation.        PSA 1.85  TSH 2.1    Assessment:   (1) 72 y.o. 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  ERP+, PRP+, H2N neg.  Fish 1+ positive, this is now called H2N low.  Local invasion into L breast nipple epidermis or skin without ulceration.  Stage 2 dx.    (2)Oncotype dx testing.  See discussion in HPI above.    (3)BrCa1&2 testing, was negative.   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  See discussion in HPI above.    R breast Mamm and R breast MRI 3/2023.  RTC 3/2023.

## 2022-12-22 NOTE — TELEPHONE ENCOUNTER
I want to mail a copy of my PN from 12/21/22,  And oncotype Dx, and BRCA 1 & 2, Expanded Test Results  To [facility] male breast specialist , Madelin Arteaga MD

## 2022-12-22 NOTE — TELEPHONE ENCOUNTER
----- Message from Rosa Person sent at 12/22/2022  8:49 AM CST -----  Pt would like to know why the genetic tests that doc went over with him is not posted on [facility].    [phone]

## 2023-01-10 ENCOUNTER — OFFICE VISIT (OUTPATIENT)
Dept: SURGERY | Facility: CLINIC | Age: 73
End: 2023-01-10
Payer: MEDICARE

## 2023-01-10 VITALS
HEIGHT: 72 IN | BODY MASS INDEX: 24.11 KG/M2 | WEIGHT: 178 LBS | SYSTOLIC BLOOD PRESSURE: 182 MMHG | HEART RATE: 57 BPM | OXYGEN SATURATION: 96 % | DIASTOLIC BLOOD PRESSURE: 88 MMHG

## 2023-01-10 DIAGNOSIS — Z15.02 CHEK2-RELATED BREAST CANCER: Primary | ICD-10-CM

## 2023-01-10 DIAGNOSIS — Z15.89 CHEK2-RELATED BREAST CANCER: Primary | ICD-10-CM

## 2023-01-10 DIAGNOSIS — Z15.09 CHEK2-RELATED BREAST CANCER: Primary | ICD-10-CM

## 2023-01-10 DIAGNOSIS — C50.919 CHEK2-RELATED BREAST CANCER: Primary | ICD-10-CM

## 2023-01-10 PROCEDURE — 99999 PR PBB SHADOW E&M-EST. PATIENT-LVL III: ICD-10-PCS | Mod: PBBFAC,,, | Performed by: SURGERY

## 2023-01-10 PROCEDURE — 99213 OFFICE O/P EST LOW 20 MIN: CPT | Mod: S$PBB,,, | Performed by: SURGERY

## 2023-01-10 PROCEDURE — 99213 OFFICE O/P EST LOW 20 MIN: CPT | Mod: PBBFAC | Performed by: SURGERY

## 2023-01-10 PROCEDURE — 99213 PR OFFICE/OUTPT VISIT, EST, LEVL III, 20-29 MIN: ICD-10-PCS | Mod: S$PBB,,, | Performed by: SURGERY

## 2023-01-10 PROCEDURE — 99999 PR PBB SHADOW E&M-EST. PATIENT-LVL III: CPT | Mod: PBBFAC,,, | Performed by: SURGERY

## 2023-01-10 NOTE — PROGRESS NOTES
[facility] Surgery  Follow-up    Subjective:     Chief Complaint:  Follow-up left mastectomy.    HPI:  Perez Kenyon is a 72 y.o. male presents today for follow-up examination left mastectomy.  Patient since last visit has found to have the genetic mutation CHEK which increases the patient's risk of male breast cancer.  He is seen Medical Oncology.  He has been offered hormone receptor modulators as well as chemotherapy.  He is declined.  Risk of recurrent breast cancer 30%.  He presents today for follow-up evaluation.  Under the left arm patient does have some swelling, mild, no erythema.  Likely still postoperative fluid.    Review of Systems   Constitutional:  Negative for appetite change, chills and fever.   HENT:  Negative for congestion, dental problem and drooling.    Eyes:  Negative for photophobia, discharge and itching.   Respiratory:  Negative for apnea and chest tightness.    Cardiovascular:  Negative for chest pain, palpitations and leg swelling.   Gastrointestinal:  Negative for abdominal distention and abdominal pain.   Endocrine: Negative for cold intolerance and heat intolerance.   Genitourinary:  Negative for difficulty urinating and dysuria.   Musculoskeletal:  Negative for arthralgias and back pain.   Skin:  Negative for color change and pallor.   Neurological:  Negative for dizziness, facial asymmetry and headaches.   Hematological:  Negative for adenopathy. Does not bruise/bleed easily.   Psychiatric/Behavioral:  Negative for agitation, behavioral problems and confusion.      Objective:      Vitals:    01/10/23 0946   BP: (!) 182/88   Pulse: (!) 57   SpO2: 96%   Weight: 80.7 kg (178 lb)   Height: 6' (1.829 m)     Physical Exam  Constitutional:       Appearance: He is well-developed. He is not diaphoretic.   HENT:      Head: Normocephalic and atraumatic.   Eyes:      Pupils: Pupils are equal, round, and reactive to light.   Neck:      Thyroid: No thyromegaly.   Cardiovascular:       Rate and Rhythm: Normal rate and regular rhythm.      Heart sounds: No murmur heard.  Pulmonary:      Effort: Pulmonary effort is normal. No respiratory distress.      Breath sounds: Normal breath sounds.   Abdominal:      General: Bowel sounds are normal. There is no distension.      Palpations: Abdomen is soft.      Tenderness: There is no abdominal tenderness.   Musculoskeletal:         General: Normal range of motion.        Arms:       Cervical back: Normal range of motion and neck supple.      Comments: Left mid axillary swelling, mild.  No evidence of erythema.   Skin:     General: Skin is warm.      Capillary Refill: Capillary refill takes less than 2 seconds.      Findings: No erythema or rash.   Neurological:      Mental Status: He is alert and oriented to person, place, and time.      Cranial Nerves: No cranial nerve deficit.        Assessment:       1. CHEK2-related breast cancer          Plan:   CHEK2-related breast cancer        Medical Decision Making/Counseling:  Recommendation will be patient to continue current care.  No need for intervention at this time.  Follow-up surgery clinic indicated in 3-6 months.  If continued swelling, recommend ultrasound.  Patient voiced understanding, and agreement.    Follow up:  3-6 months    Patient instructed that best way to communicate with my office staff is for patient to get on the [facility] patient portal to expedite communication and communication issues that may occur.  Patient was given instructions on how to get on the portal.  I encouraged patient to obtain portal access as well.  Ultimately it is up to the patient to obtain access.  Patient voiced understanding.

## 2023-02-13 ENCOUNTER — TELEPHONE (OUTPATIENT)
Dept: HEMATOLOGY/ONCOLOGY | Facility: CLINIC | Age: 73
End: 2023-02-13

## 2023-02-13 DIAGNOSIS — C50.022 MALIGNANT NEOPLASM OF NIPPLE OF LEFT BREAST IN MALE, ESTROGEN RECEPTOR POSITIVE: Primary | ICD-10-CM

## 2023-02-13 DIAGNOSIS — Z17.0 MALIGNANT NEOPLASM OF NIPPLE OF LEFT BREAST IN MALE, ESTROGEN RECEPTOR POSITIVE: Primary | ICD-10-CM

## 2023-02-17 ENCOUNTER — HOSPITAL ENCOUNTER (OUTPATIENT)
Dept: RADIOLOGY | Facility: HOSPITAL | Age: 73
Discharge: HOME | End: 2023-02-17
Attending: INTERNAL MEDICINE
Payer: MEDICARE

## 2023-02-17 DIAGNOSIS — Z17.0 MALIGNANT NEOPLASM OF NIPPLE OF LEFT BREAST IN MALE, ESTROGEN RECEPTOR POSITIVE: ICD-10-CM

## 2023-02-17 DIAGNOSIS — C50.022 MALIGNANT NEOPLASM OF NIPPLE OF LEFT BREAST IN MALE, ESTROGEN RECEPTOR POSITIVE: ICD-10-CM

## 2023-02-17 DIAGNOSIS — D17.1 LIPOMA OF ANTERIOR CHEST WALL: ICD-10-CM

## 2023-02-17 DIAGNOSIS — N63.0 PALPABLE MASS OF BREAST: ICD-10-CM

## 2023-02-17 DIAGNOSIS — C50.912 MALIGNANT NEOPLASM OF LEFT BREAST: ICD-10-CM

## 2023-02-17 PROCEDURE — 77065 DX MAMMO INCL CAD UNI: CPT | Mod: TC,RT

## 2023-02-17 PROCEDURE — 77061 BREAST TOMOSYNTHESIS UNI: CPT | Mod: 26,RT,, | Performed by: RADIOLOGY

## 2023-02-17 PROCEDURE — 77065 MAMMO DIGITAL DIAGNOSTIC RIGHT WITH TOMO: ICD-10-PCS | Mod: 26,RT,, | Performed by: RADIOLOGY

## 2023-02-17 PROCEDURE — 77061 MAMMO DIGITAL DIAGNOSTIC RIGHT WITH TOMO: ICD-10-PCS | Mod: 26,RT,, | Performed by: RADIOLOGY

## 2023-02-17 PROCEDURE — 77065 DX MAMMO INCL CAD UNI: CPT | Mod: 26,RT,, | Performed by: RADIOLOGY

## 2023-03-07 ENCOUNTER — TELEPHONE (OUTPATIENT)
Dept: HEMATOLOGY/ONCOLOGY | Facility: CLINIC | Age: 73
End: 2023-03-07

## 2023-03-08 ENCOUNTER — LAB VISIT (OUTPATIENT)
Dept: LAB | Facility: HOSPITAL | Age: 73
End: 2023-03-08
Attending: INTERNAL MEDICINE
Payer: MEDICARE

## 2023-03-08 DIAGNOSIS — C50.022 MALIGNANT NEOPLASM OF NIPPLE OF LEFT BREAST IN MALE, ESTROGEN RECEPTOR POSITIVE: ICD-10-CM

## 2023-03-08 DIAGNOSIS — Z17.0 MALIGNANT NEOPLASM OF NIPPLE OF LEFT BREAST IN MALE, ESTROGEN RECEPTOR POSITIVE: ICD-10-CM

## 2023-03-08 LAB
ALBUMIN SERPL BCP-MCNC: 4 G/DL (ref 3.5–5.2)
ALP SERPL-CCNC: 58 U/L (ref 55–135)
ALT SERPL W/O P-5'-P-CCNC: 18 U/L (ref 10–44)
ANION GAP SERPL CALC-SCNC: 8 MMOL/L (ref 8–16)
AST SERPL-CCNC: 22 U/L (ref 10–40)
BASOPHILS # BLD AUTO: 0.02 K/UL (ref 0–0.2)
BASOPHILS NFR BLD: 0.2 % (ref 0–1.9)
BILIRUB SERPL-MCNC: 0.6 MG/DL (ref 0.1–1)
BUN SERPL-MCNC: 23 MG/DL (ref 8–23)
CALCIUM SERPL-MCNC: 9.3 MG/DL (ref 8.7–10.5)
CHLORIDE SERPL-SCNC: 106 MMOL/L (ref 95–110)
CO2 SERPL-SCNC: 23 MMOL/L (ref 23–29)
CREAT SERPL-MCNC: 1 MG/DL (ref 0.5–1.4)
DIFFERENTIAL METHOD: ABNORMAL
EOSINOPHIL # BLD AUTO: 0.1 K/UL (ref 0–0.5)
EOSINOPHIL NFR BLD: 0.9 % (ref 0–8)
ERYTHROCYTE [DISTWIDTH] IN BLOOD BY AUTOMATED COUNT: 13.4 % (ref 11.5–14.5)
EST. GFR  (NO RACE VARIABLE): >60 ML/MIN/1.73 M^2
GLUCOSE SERPL-MCNC: 95 MG/DL (ref 70–110)
HCT VFR BLD AUTO: 47.2 % (ref 40–54)
HGB BLD-MCNC: 16.1 G/DL (ref 14–18)
IMM GRANULOCYTES # BLD AUTO: 0.02 K/UL (ref 0–0.04)
IMM GRANULOCYTES NFR BLD AUTO: 0.2 % (ref 0–0.5)
LYMPHOCYTES # BLD AUTO: 1.4 K/UL (ref 1–4.8)
LYMPHOCYTES NFR BLD: 16.9 % (ref 18–48)
MCH RBC QN AUTO: 32.7 PG (ref 27–31)
MCHC RBC AUTO-ENTMCNC: 34.1 G/DL (ref 32–36)
MCV RBC AUTO: 96 FL (ref 82–98)
MONOCYTES # BLD AUTO: 0.6 K/UL (ref 0.3–1)
MONOCYTES NFR BLD: 6.6 % (ref 4–15)
NEUTROPHILS # BLD AUTO: 6.4 K/UL (ref 1.8–7.7)
NEUTROPHILS NFR BLD: 75.2 % (ref 38–73)
NRBC BLD-RTO: 0 /100 WBC
PLATELET # BLD AUTO: 209 K/UL (ref 150–450)
PMV BLD AUTO: 10.1 FL (ref 9.2–12.9)
POTASSIUM SERPL-SCNC: 4.6 MMOL/L (ref 3.5–5.1)
PROT SERPL-MCNC: 6.9 G/DL (ref 6–8.4)
RBC # BLD AUTO: 4.93 M/UL (ref 4.6–6.2)
SODIUM SERPL-SCNC: 137 MMOL/L (ref 136–145)
WBC # BLD AUTO: 8.52 K/UL (ref 3.9–12.7)

## 2023-03-08 PROCEDURE — 80053 COMPREHEN METABOLIC PANEL: CPT | Performed by: INTERNAL MEDICINE

## 2023-03-08 PROCEDURE — 36415 COLL VENOUS BLD VENIPUNCTURE: CPT | Performed by: INTERNAL MEDICINE

## 2023-03-08 PROCEDURE — 85025 COMPLETE CBC W/AUTO DIFF WBC: CPT | Performed by: INTERNAL MEDICINE

## 2023-03-08 PROCEDURE — 86300 IMMUNOASSAY TUMOR CA 15-3: CPT | Performed by: INTERNAL MEDICINE

## 2023-03-09 LAB
CANCER AG15-3 SERPL IA-ACNC: 8.2 U/ML
CANCER AG27-29 SERPL-ACNC: <12 U/ML

## 2023-03-16 ENCOUNTER — OFFICE VISIT (OUTPATIENT)
Dept: HEMATOLOGY/ONCOLOGY | Facility: CLINIC | Age: 73
End: 2023-03-16
Payer: MEDICARE

## 2023-03-16 VITALS
HEIGHT: 72 IN | BODY MASS INDEX: 23.55 KG/M2 | HEART RATE: 55 BPM | RESPIRATION RATE: 18 BRPM | DIASTOLIC BLOOD PRESSURE: 92 MMHG | SYSTOLIC BLOOD PRESSURE: 180 MMHG | TEMPERATURE: 98 F | WEIGHT: 173.88 LBS

## 2023-03-16 DIAGNOSIS — C50.022 MALIGNANT NEOPLASM OF NIPPLE OF LEFT BREAST IN MALE, ESTROGEN RECEPTOR POSITIVE: Primary | ICD-10-CM

## 2023-03-16 DIAGNOSIS — Z17.0 MALIGNANT NEOPLASM OF NIPPLE OF LEFT BREAST IN MALE, ESTROGEN RECEPTOR POSITIVE: Primary | ICD-10-CM

## 2023-03-16 PROCEDURE — 99214 PR OFFICE/OUTPT VISIT, EST, LEVL IV, 30-39 MIN: ICD-10-PCS | Mod: S$GLB,,, | Performed by: INTERNAL MEDICINE

## 2023-03-16 PROCEDURE — 99214 OFFICE O/P EST MOD 30 MIN: CPT | Mod: S$GLB,,, | Performed by: INTERNAL MEDICINE

## 2023-03-16 NOTE — LETTER
March 18, 2023        Alton Maguire MD  [address]  [facility] [place] [address]             [facility] - Hematology Oncology  [address] [facility] [address]  Phone: [phone]  Fax: [phone]   Patient: Perez Kenyon   MR Number: [id number]   YOB: 1950   Date of Visit: 3/16/2023       Dear Dr. Maguire:    Thank you for referring Perez Kenyon to me for evaluation. Below are the relevant portions of my assessment and plan of care.            If you have questions, please do not hesitate to call me. I look forward to following Perez along with you.    Sincerely,      MYRON Barraza MD           CC    No Recipients

## 2023-03-18 NOTE — PROGRESS NOTES
[facility] Hematology Oncology In Office Subsequent Encounter Note    3/16/23    Subjective:      Patient ID:   Perez Kenyon  73 y.o. male  1950  MD Terry Turner MD Sharon Giordano, MD Angela Winfield, MD    Chief Complaint:   Breast cancer    HPI:  73 y.o. male found lump at L breast below the nipple area.  22.  Mamm 2.3 cm mass at retroareolar area.  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  ERP+, PRP+, H2N neg. Ki-67 10%.  L modified radical mastectomy 22   Invasive ductal carcinoma, 2.1 cm, grade 2.  DCIS +.  Invasive carcinoma goes into epidermis, skin, but with out ulceration of nipple.  Surgical margins are clear. 0/8 axillary LN are positive.    He has met with Dr. Madelin Arteaga of [facility]., male breast cancer specialist.    Stage 2 Dx by primary size, LN negative.    Oncotype Dx test supports 19% recurrence at 9 years from Dx, when treated with Tamoxifen or Arimidex adjuvantly.  Recurrence Score was 30.  Oncotype Dx supported that adjuvant chemotherapy in addition to Endocrine or hormonal therapy would decrease recurrence risk further.  I would estimate down to 12-13%  Over 9 years of follow up.    I discussed adjuvant Tamoxifen or Arimidex with him x's 5-10 years, and adjuvant chemotherapy trial q 3 weeks x's 4-6 cycles to try decrease RR per Oncotype Dx reports.  He has decided to go with observation now.  He does not agree to adjuvant hormonal, endocrine or adjuvant chemotherapy Rx.    He has male breast cancer.  BRCA 1 & 2 are negative.  But he is POSITIVE for CHEK2 gene mutation.  Risk of breast cancer may be as high as 20-40% in females.  Also 5-10% risk of colon cancer.  Some studies support increased risk of melanoma and prostate cancer.    He is not interested in prophylactic mastectomy at the other breast.  His parents are . He has no siblings.  He has a son and 3 grand daughters.  The  son can have him self checked to see if he has CHEK2 mutation  Passed down to himself.  I think a 50/50% chance of passing this to his son.    Check Mamm and MRI 3/2023.    He wants to defer CT of CAP, bone scan or PET scan for now, he will think on it, at time of last visit 12/2022.    Today we discussed MRI scan of breast.  This was ordered, but he does not want to do the MRI now.  Discussed PET scan again, he wants to defer it for now.  Son and daughter are aware of genetic study mutation and increased risk of breast cancer and GI cancer.      R rotator cuff surgery 2008.  Last colonoscopy 11/11/20, Dr. Park.    Smoke no, ETOH yes, Allergy no.    Meds pepcid.    Mom CVA, no cancer.  Dad prostate cancer.  1/2 Brother & 1/2 Brother A & W.  Son & Daughter A & W.    ROS:   GEN: normal without any fever, night sweats or weight loss  HEENT: normal with no HA's, sore throat, stiff neck, changes in vision  CV: normal with no CP, SOB, PND, PRADO or orthopnea  PULM: normal with no SOB, cough, hemoptysis, sputum or pleuritic pain  GI: normal with no abdominal pain, nausea, vomiting, constipation, diarrhea, melanotic stools, BRBPR, or hematemesis  : normal with no hematuria, dysuria  BREAST: See HPI  SKIN: normal with no rash, erythema, bruising, or swelling     Past Medical History:   Diagnosis Date    Cancer     Breast     Past Surgical History:   Procedure Laterality Date    BIOPSY OF AXILLARY NODE N/A 8/17/2022    Procedure: BIOPSY, LYMPH NODE, AXILLARY;  Surgeon: Terry Tinajero MD;  Location: [facility] OR;  Service: General;  Laterality: N/A;    MASTECTOMY N/A 8/17/2022    Procedure: MASTECTOMY;  Surgeon: Terry Tinajero MD;  Location: [facility] OR;  Service: General;  Laterality: N/A;  1) left mastectomy with axillary node dissection 19307  2) injection of Lymphazurin blue dye left chest    ROTATOR CUFF REPAIR Right 2008       Review of patient's allergies indicates:  No Known Allergies  Social History      Socioeconomic History    Marital status:    Occupational History    Occupation: retired   Tobacco Use    Smoking status: Never    Smokeless tobacco: Never   Substance and Sexual Activity    Alcohol use: Yes    Drug use: Not Currently    Sexual activity: Yes     Partners: Female         Current Outpatient Medications:     famotidine (PEPCID) 20 MG tablet, Take 20 mg by mouth daily as needed., Disp: , Rfl:     levoFLOXacin (LEVAQUIN) 750 MG tablet, Take 750 mg by mouth., Disp: , Rfl:     omeprazole (PRILOSEC) 40 MG capsule, Take 40 mg by mouth., Disp: , Rfl:     azithromycin (Z-MECHELLE) 250 MG tablet, Take by mouth., Disp: , Rfl:           Objective:   Vitals:  Blood pressure (!) 180/92, pulse (!) 55, temperature 97.7 °F (36.5 °C), resp. rate 18, height 6' (1.829 m), weight 78.9 kg (173 lb 14.4 oz).    Physical Examination:   GEN: no apparent distress, comfortable  HEAD: atraumatic and normocephalic  EYES: no pallor, no icterus  ENT: no pharyngeal erythema, external ears WNL; no nasal discharge  NECK: no masses, thyroid normal, trachea midline, no LAD/LN's, supple  CV: RRR with no murmur; normal pulse; normal S1 and S2; no pedal edema  CHEST: Normal respiratory effort; CTAB; normal breath sounds; no wheeze or crackles  ABDOM: nontender and nondistended; soft; no rebound/guarding, L/S NP  MUSC/Skeletal: ROM normal; no crepitus; joints normal  EXTREM: no clubbing, cyanosis, inflammation or swelling  SKIN: no rashes, lesions, ulcers, petechiae  : no cvat  NEURO: grossly intact; motor/sensory WNL; no tremors  PSYCH: normal mood, affect and behavior  LYMPH: normal cervical, supraclavicular, axillary LN's  BREASTS: L chest NT, incisions closed, no palpable mass.  R breast NT, no palpable mass.      Labs:   Lab Results   Component Value Date    WBC 8.52 03/08/2023    HGB 16.1 03/08/2023    HCT 47.2 03/08/2023    MCV 96 03/08/2023     03/08/2023    CMP  Sodium   Date Value Ref Range Status   03/08/2023 137 136  - 145 mmol/L Final     Potassium   Date Value Ref Range Status   03/08/2023 4.6 3.5 - 5.1 mmol/L Final     Chloride   Date Value Ref Range Status   03/08/2023 106 95 - 110 mmol/L Final     CO2   Date Value Ref Range Status   03/08/2023 23 23 - 29 mmol/L Final     Glucose   Date Value Ref Range Status   03/08/2023 95 70 - 110 mg/dL Final     BUN   Date Value Ref Range Status   03/08/2023 23 8 - 23 mg/dL Final     Creatinine   Date Value Ref Range Status   03/08/2023 1.0 0.5 - 1.4 mg/dL Final     Calcium   Date Value Ref Range Status   03/08/2023 9.3 8.7 - 10.5 mg/dL Final     Total Protein   Date Value Ref Range Status   03/08/2023 6.9 6.0 - 8.4 g/dL Final     Albumin   Date Value Ref Range Status   03/08/2023 4.0 3.5 - 5.2 g/dL Final     Total Bilirubin   Date Value Ref Range Status   03/08/2023 0.6 0.1 - 1.0 mg/dL Final     Comment:     For infants and newborns, interpretation of results should be based  on gestational age, weight and in agreement with clinical  observations.    Premature Infant recommended reference ranges:  Up to 24 hours.............<8.0 mg/dL  Up to 48 hours............<12.0 mg/dL  3-5 days..................<15.0 mg/dL  6-29 days.................<15.0 mg/dL       Alkaline Phosphatase   Date Value Ref Range Status   03/08/2023 58 55 - 135 U/L Final     AST   Date Value Ref Range Status   03/08/2023 22 10 - 40 U/L Final     ALT   Date Value Ref Range Status   03/08/2023 18 10 - 44 U/L Final     Anion Gap   Date Value Ref Range Status   03/08/2023 8 8 - 16 mmol/L Final     eGFR if    Date Value Ref Range Status   07/18/2022 >60.0 >60 mL/min/1.73 m^2 Final     eGFR if non    Date Value Ref Range Status   07/18/2022 >60.0 >60 mL/min/1.73 m^2 Final     Comment:     Calculation used to obtain the estimated glomerular filtration  rate (eGFR) is the CKD-EPI equation.        PSA 1.85  TSH 2.1    Assessment:   (1) 73 y.o. male with diagnosis of Stage 2 invasive mucinous  carcinoma by primary size, LN negative.  ERP+, PRP+, H2N neg.  Fish 1+ positive, this is now called H2N low.  Local invasion into L breast nipple epidermis or skin without ulceration.  Stage 2 dx.    (2)Oncotype dx testing.  See discussion in HPI above.    (3)BrCa1&2 testing, was negative.  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  See discussion in HPI above.    R breast Mamm 2/17/2023. Negative.    Observe for now.      RTC 6 months with lab [facility].  He did not agree to RTC 4 months.

## 2023-05-31 DIAGNOSIS — K59.00 CN (CONSTIPATION): ICD-10-CM

## 2023-05-31 DIAGNOSIS — Z85.3 HX OF BREAST CANCER: ICD-10-CM

## 2023-05-31 DIAGNOSIS — N41.9 PROSTATITIS: ICD-10-CM

## 2023-05-31 DIAGNOSIS — R19.4 CHANGE IN BOWEL HABITS: Primary | ICD-10-CM

## 2023-06-01 ENCOUNTER — HOSPITAL ENCOUNTER (OUTPATIENT)
Dept: RADIOLOGY | Facility: HOSPITAL | Age: 73
Discharge: HOME | End: 2023-06-01
Attending: FAMILY MEDICINE
Payer: MEDICARE

## 2023-06-01 DIAGNOSIS — Z85.3 HX OF BREAST CANCER: ICD-10-CM

## 2023-06-01 DIAGNOSIS — R19.4 CHANGE IN BOWEL HABITS: ICD-10-CM

## 2023-06-01 DIAGNOSIS — K59.00 CN (CONSTIPATION): ICD-10-CM

## 2023-06-01 DIAGNOSIS — N41.9 PROSTATITIS: ICD-10-CM

## 2023-06-01 LAB
CREAT SERPL-MCNC: 1.1 MG/DL (ref 0.5–1.4)
SAMPLE: NORMAL

## 2023-06-01 PROCEDURE — 82565 ASSAY OF CREATININE: CPT | Mod: PO

## 2023-06-01 PROCEDURE — 25500020 PHARM REV CODE 255: Mod: PO | Performed by: FAMILY MEDICINE

## 2023-06-01 RX ADMIN — IOHEXOL 100 ML: 350 INJECTION, SOLUTION INTRAVENOUS at 01:06

## 2023-07-11 ENCOUNTER — OFFICE VISIT (OUTPATIENT)
Dept: SURGERY | Facility: CLINIC | Age: 73
End: 2023-07-11
Payer: MEDICARE

## 2023-07-11 VITALS
HEIGHT: 72 IN | WEIGHT: 175 LBS | SYSTOLIC BLOOD PRESSURE: 127 MMHG | DIASTOLIC BLOOD PRESSURE: 81 MMHG | HEART RATE: 66 BPM | BODY MASS INDEX: 23.7 KG/M2 | OXYGEN SATURATION: 96 %

## 2023-07-11 DIAGNOSIS — C50.922 MALIGNANT NEOPLASM OF LEFT MALE BREAST, UNSPECIFIED ESTROGEN RECEPTOR STATUS, UNSPECIFIED SITE OF BREAST: Primary | ICD-10-CM

## 2023-07-11 PROCEDURE — 99213 PR OFFICE/OUTPT VISIT, EST, LEVL III, 20-29 MIN: ICD-10-PCS | Mod: S$PBB,,, | Performed by: SURGERY

## 2023-07-11 PROCEDURE — 99213 OFFICE O/P EST LOW 20 MIN: CPT | Mod: PBBFAC | Performed by: SURGERY

## 2023-07-11 PROCEDURE — 99999 PR PBB SHADOW E&M-EST. PATIENT-LVL III: ICD-10-PCS | Mod: PBBFAC,,, | Performed by: SURGERY

## 2023-07-11 PROCEDURE — 99213 OFFICE O/P EST LOW 20 MIN: CPT | Mod: S$PBB,,, | Performed by: SURGERY

## 2023-07-11 PROCEDURE — 99999 PR PBB SHADOW E&M-EST. PATIENT-LVL III: CPT | Mod: PBBFAC,,, | Performed by: SURGERY

## 2023-07-11 NOTE — PROGRESS NOTES
[facility] Surgery  Follow-up    Subjective:     Chief Complaint:  Follow-up left chest wall cancer.    HPI:  Perez Kenyon is a 73 y.o. male doing well today.  History of left chest wall cancer.  Now surgically removed.  Post treatment.  Doing Well.  No apparent issues.  Procedural site looks phenomena.  Well-healed.  Previous seroma resolved.  Recent CT scans reviewed look good.  Very small pulmonary nodule.  Monitor with repeat imaging in 1 year.  Labs look reasonable.  No other new issues or complaints today.    Review of Systems   Constitutional:  Negative for appetite change, chills and fever.   HENT:  Negative for congestion, dental problem and drooling.    Eyes:  Negative for photophobia, discharge and itching.   Respiratory:  Negative for apnea and chest tightness.    Cardiovascular:  Negative for chest pain, palpitations and leg swelling.   Gastrointestinal:  Negative for abdominal distention and abdominal pain.   Endocrine: Negative for cold intolerance and heat intolerance.   Genitourinary:  Negative for difficulty urinating and dysuria.   Musculoskeletal:  Negative for arthralgias and back pain.   Skin:  Negative for color change and pallor.   Neurological:  Negative for dizziness, facial asymmetry and headaches.   Hematological:  Negative for adenopathy. Does not bruise/bleed easily.   Psychiatric/Behavioral:  Negative for agitation, behavioral problems and confusion.      Objective:      Vitals:    07/11/23 0900   BP: 127/81   Pulse: 66   SpO2: 96%   Weight: 79.4 kg (175 lb)   Height: 6' (1.829 m)     Physical Exam  Constitutional:       Appearance: He is well-developed. He is not diaphoretic.   HENT:      Head: Normocephalic and atraumatic.   Eyes:      Pupils: Pupils are equal, round, and reactive to light.   Neck:      Thyroid: No thyromegaly.   Cardiovascular:      Rate and Rhythm: Normal rate and regular rhythm.      Heart sounds: No murmur heard.  Pulmonary:      Effort: Pulmonary  effort is normal. No respiratory distress.      Breath sounds: Normal breath sounds.   Abdominal:      General: Bowel sounds are normal. There is no distension.      Palpations: Abdomen is soft.      Tenderness: There is no abdominal tenderness.   Musculoskeletal:         General: Normal range of motion.      Cervical back: Normal range of motion and neck supple.   Skin:     General: Skin is warm.      Capillary Refill: Capillary refill takes less than 2 seconds.      Findings: No erythema or rash.   Neurological:      Mental Status: He is alert and oriented to person, place, and time.      Cranial Nerves: No cranial nerve deficit.        Assessment:       1. Malignant neoplasm of left male breast, unspecified estrogen receptor status, unspecified site of breast        Plan:   Malignant neoplasm of left male breast, unspecified estrogen receptor status, unspecified site of breast        Medical Decision Making/Counseling:  Status post chest wall resection.  Doing well.  No evidence of recurrence.  Follow-up surgery clinic as needed.    Follow up:  As needed.    Patient instructed that best way to communicate with my office staff is for patient to get on the [facility] epic patient portal to expedite communication and communication issues that may occur.  Patient was given instructions on how to get on the portal.  I encouraged patient to obtain portal access as well.  Ultimately it is up to the patient to obtain access.  Patient voiced understanding.

## 2023-09-15 ENCOUNTER — LAB VISIT (OUTPATIENT)
Dept: LAB | Facility: HOSPITAL | Age: 73
End: 2023-09-15
Attending: INTERNAL MEDICINE
Payer: MEDICARE

## 2023-09-15 DIAGNOSIS — Z17.0 MALIGNANT NEOPLASM OF NIPPLE OF LEFT BREAST IN MALE, ESTROGEN RECEPTOR POSITIVE: ICD-10-CM

## 2023-09-15 DIAGNOSIS — C50.022 MALIGNANT NEOPLASM OF NIPPLE OF LEFT BREAST IN MALE, ESTROGEN RECEPTOR POSITIVE: ICD-10-CM

## 2023-09-15 LAB
ALBUMIN SERPL BCP-MCNC: 4.3 G/DL (ref 3.5–5.2)
ALP SERPL-CCNC: 66 U/L (ref 55–135)
ALT SERPL W/O P-5'-P-CCNC: 22 U/L (ref 10–44)
ANION GAP SERPL CALC-SCNC: 10 MMOL/L (ref 8–16)
AST SERPL-CCNC: 24 U/L (ref 10–40)
BASOPHILS # BLD AUTO: 0.02 K/UL (ref 0–0.2)
BASOPHILS NFR BLD: 0.2 % (ref 0–1.9)
BILIRUB SERPL-MCNC: 0.8 MG/DL (ref 0.1–1)
BUN SERPL-MCNC: 23 MG/DL (ref 8–23)
CALCIUM SERPL-MCNC: 9.5 MG/DL (ref 8.7–10.5)
CHLORIDE SERPL-SCNC: 107 MMOL/L (ref 95–110)
CO2 SERPL-SCNC: 24 MMOL/L (ref 23–29)
CREAT SERPL-MCNC: 1.1 MG/DL (ref 0.5–1.4)
DIFFERENTIAL METHOD: ABNORMAL
EOSINOPHIL # BLD AUTO: 0.1 K/UL (ref 0–0.5)
EOSINOPHIL NFR BLD: 1 % (ref 0–8)
ERYTHROCYTE [DISTWIDTH] IN BLOOD BY AUTOMATED COUNT: 13.2 % (ref 11.5–14.5)
EST. GFR  (NO RACE VARIABLE): >60 ML/MIN/1.73 M^2
GLUCOSE SERPL-MCNC: 93 MG/DL (ref 70–110)
HCT VFR BLD AUTO: 49.9 % (ref 40–54)
HGB BLD-MCNC: 17 G/DL (ref 14–18)
IMM GRANULOCYTES # BLD AUTO: 0.03 K/UL (ref 0–0.04)
IMM GRANULOCYTES NFR BLD AUTO: 0.3 % (ref 0–0.5)
LYMPHOCYTES # BLD AUTO: 1.5 K/UL (ref 1–4.8)
LYMPHOCYTES NFR BLD: 17 % (ref 18–48)
MCH RBC QN AUTO: 33.2 PG (ref 27–31)
MCHC RBC AUTO-ENTMCNC: 34.1 G/DL (ref 32–36)
MCV RBC AUTO: 98 FL (ref 82–98)
MONOCYTES # BLD AUTO: 0.8 K/UL (ref 0.3–1)
MONOCYTES NFR BLD: 9 % (ref 4–15)
NEUTROPHILS # BLD AUTO: 6.6 K/UL (ref 1.8–7.7)
NEUTROPHILS NFR BLD: 72.5 % (ref 38–73)
NRBC BLD-RTO: 0 /100 WBC
PLATELET # BLD AUTO: 236 K/UL (ref 150–450)
PMV BLD AUTO: 10.1 FL (ref 9.2–12.9)
POTASSIUM SERPL-SCNC: 4.9 MMOL/L (ref 3.5–5.1)
PROT SERPL-MCNC: 7.3 G/DL (ref 6–8.4)
RBC # BLD AUTO: 5.12 M/UL (ref 4.6–6.2)
SODIUM SERPL-SCNC: 141 MMOL/L (ref 136–145)
WBC # BLD AUTO: 9.08 K/UL (ref 3.9–12.7)

## 2023-09-15 PROCEDURE — 36415 COLL VENOUS BLD VENIPUNCTURE: CPT | Performed by: INTERNAL MEDICINE

## 2023-09-15 PROCEDURE — 85025 COMPLETE CBC W/AUTO DIFF WBC: CPT | Performed by: INTERNAL MEDICINE

## 2023-09-15 PROCEDURE — 80053 COMPREHEN METABOLIC PANEL: CPT | Performed by: INTERNAL MEDICINE

## 2023-09-15 PROCEDURE — 86300 IMMUNOASSAY TUMOR CA 15-3: CPT | Performed by: INTERNAL MEDICINE

## 2023-09-18 LAB
CANCER AG15-3 SERPL IA-ACNC: 8.3 U/ML
CANCER AG27-29 SERPL-ACNC: <12 U/ML

## 2023-09-19 ENCOUNTER — OFFICE VISIT (OUTPATIENT)
Dept: HEMATOLOGY/ONCOLOGY | Facility: CLINIC | Age: 73
End: 2023-09-19
Payer: MEDICARE

## 2023-09-19 VITALS
SYSTOLIC BLOOD PRESSURE: 177 MMHG | DIASTOLIC BLOOD PRESSURE: 94 MMHG | TEMPERATURE: 98 F | BODY MASS INDEX: 23.92 KG/M2 | WEIGHT: 176.63 LBS | HEIGHT: 72 IN | HEART RATE: 56 BPM | RESPIRATION RATE: 16 BRPM

## 2023-09-19 DIAGNOSIS — Z17.0 MALIGNANT NEOPLASM OF NIPPLE OF LEFT BREAST IN MALE, ESTROGEN RECEPTOR POSITIVE: Primary | ICD-10-CM

## 2023-09-19 DIAGNOSIS — C50.022 MALIGNANT NEOPLASM OF NIPPLE OF LEFT BREAST IN MALE, ESTROGEN RECEPTOR POSITIVE: Primary | ICD-10-CM

## 2023-09-19 PROCEDURE — 99214 PR OFFICE/OUTPT VISIT, EST, LEVL IV, 30-39 MIN: ICD-10-PCS | Mod: S$GLB,,, | Performed by: INTERNAL MEDICINE

## 2023-09-19 PROCEDURE — 99214 OFFICE O/P EST MOD 30 MIN: CPT | Mod: S$GLB,,, | Performed by: INTERNAL MEDICINE

## 2023-09-19 NOTE — LETTER
September 20, 2023        Alton Maguire MD  [address]  [facility] [place] [address]             [facility] - Hematology Oncology  [address] [facility] [address]  Phone: [phone]  Fax: [phone]   Patient: Perez Kenyon   MR Number: [id number]   YOB: 1950   Date of Visit: 9/19/2023       Dear Dr. Maguire:    Thank you for referring Perez Kenyon to me for evaluation. Below are the relevant portions of my assessment and plan of care.            If you have questions, please do not hesitate to call me. I look forward to following Perez along with you.    Sincerely,      MYRON Barraza MD           CC    No Recipients

## 2023-09-19 NOTE — PROGRESS NOTES
[facility] Hematology Oncology In Office Subsequent Encounter Note    23    Subjective:      Patient ID:   Perez Kenyon  73 y.o. male  1950  MD Terry Turner MD Sharon Giordano, MD Angela Winfield, MD    Chief Complaint:   Breast cancer    HPI:  73 y.o. male found lump at L breast below the nipple area.  22.  Mamm 2.3 cm mass at retroareolar area.  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  ERP+, PRP+, H2N neg. Ki-67 10%.  L modified radical mastectomy 22   Invasive ductal carcinoma, 2.1 cm, grade 2.  DCIS +.  Invasive carcinoma goes into epidermis, skin, but with out ulceration of nipple.  Surgical margins are clear. 0/8 axillary LN are positive.    He has met with Dr. Madelin Arteaga of [facility]., male breast cancer specialist.    Stage 2 Dx by primary size, LN negative.    Oncotype Dx test supports 19% recurrence at 9 years from Dx, when treated with Tamoxifen or Arimidex adjuvantly.  Recurrence Score was 30.  Oncotype Dx supported that adjuvant chemotherapy in addition to Endocrine or hormonal therapy would decrease recurrence risk further.  I would estimate down to 12-13%  Over 9 years of follow up.    I discussed adjuvant Tamoxifen or Arimidex with him x's 5-10 years, and adjuvant chemotherapy trial q 3 weeks x's 4-6 cycles to try decrease RR per Oncotype Dx reports.  He has decided to go with observation now.  He does not agree to adjuvant hormonal, endocrine or adjuvant chemotherapy Rx.    He has male breast cancer.  BRCA 1 & 2 are negative.  But he is POSITIVE for CHEK2 gene mutation.  Risk of breast cancer may be as high as 20-40% in females.  Also 5-10% risk of colon cancer.  Some studies support increased risk of melanoma and prostate cancer.    He is not interested in prophylactic mastectomy at the other breast.  His parents are . He has no siblings.  He has a son and 3 grand daughters.  The  son can have him self checked to see if he has CHEK2 mutation  Passed down to himself.  I think a 50/50% chance of passing this to his son.    Pt has discussed CHEK-2 mutation with his son, I don't believe the son is being tested.    He has a tooth ache today.  He is to see Dr. Gerald Garrison DDS today for this.  In 6/2023, he had LBP sx, found to have prostatitis, treated with antibiotics x's 3 weeks, sx resolved.  He has BPH, followed per Dr. Raphael of .    CT of abdomin 6/1/23 showed 4 mm LLL nodule, hepatic and renal cysts, enlargement of prostate, no hydronephrosis or lymphadenopathy.    He c/o feeling fullness at L scapula area.  On exam he has good ROM, NT and I do not palpate a mass or nodule.  I recommended CT of chest and posterior chest wall area. This would evaluate his scapular sx and check lung for other nodules?  He did not agree to ordering of the CT.    Last mamm and U/S was 2/17/23, I will order mamm and U/S, tumor markers [facility] after 2/17/24.    R rotator cuff surgery 2008.  Last colonoscopy 11/11/20, Dr. Park.    Smoke no, ETOH yes, Allergy no.    Meds pepcid.    Mom CVA, no cancer.  Dad prostate cancer.  1/2 Brother & 1/2 Brother A & W.  Son & Daughter A & W.    ROS:   GEN: normal without any fever, night sweats or weight loss  HEENT: normal with no HA's, sore throat, stiff neck, changes in vision  CV: normal with no CP, SOB, PND, PRADO or orthopnea  PULM: normal with no SOB, cough, hemoptysis, sputum or pleuritic pain  GI: normal with no abdominal pain, nausea, vomiting, constipation, diarrhea, melanotic stools, BRBPR, or hematemesis  : normal with no hematuria, dysuria  BREAST: See HPI  SKIN: normal with no rash, erythema, bruising, or swelling     Past Medical History:   Diagnosis Date    Cancer     Breast     Past Surgical History:   Procedure Laterality Date    BIOPSY OF AXILLARY NODE N/A 8/17/2022    Procedure: BIOPSY, LYMPH NODE, AXILLARY;  Surgeon: Terry Tinajero MD;   Location: [facility] OR;  Service: General;  Laterality: N/A;    MASTECTOMY N/A 8/17/2022    Procedure: MASTECTOMY;  Surgeon: Terry Tinajero MD;  Location: [facility] OR;  Service: General;  Laterality: N/A;  1) left mastectomy with axillary node dissection 19307  2) injection of Lymphazurin blue dye left chest    ROTATOR CUFF REPAIR Right 2008       Review of patient's allergies indicates:   Allergen Reactions    Penicillins      Social History     Socioeconomic History    Marital status:    Occupational History    Occupation: retired   Tobacco Use    Smoking status: Never    Smokeless tobacco: Never   Substance and Sexual Activity    Alcohol use: Yes    Drug use: Not Currently    Sexual activity: Yes     Partners: Female         Current Outpatient Medications:     azithromycin (Z-MECHELLE) 250 MG tablet, Take by mouth., Disp: , Rfl:     famotidine (PEPCID) 20 MG tablet, Take 20 mg by mouth daily as needed., Disp: , Rfl:     levoFLOXacin (LEVAQUIN) 750 MG tablet, Take 750 mg by mouth., Disp: , Rfl:     omeprazole (PRILOSEC) 40 MG capsule, Take 40 mg by mouth., Disp: , Rfl:           Objective:   Vitals:  Blood pressure (!) 177/94, pulse (!) 56, temperature 97.5 °F (36.4 °C), resp. rate 16, height 6' (1.829 m), weight 80.1 kg (176 lb 9.6 oz).    Physical Examination:   GEN: no apparent distress, comfortable  HEAD: atraumatic and normocephalic  EYES: no pallor, no icterus  ENT: no pharyngeal erythema, external ears WNL; no nasal discharge  NECK: no masses, thyroid normal, trachea midline, no LAD/LN's, supple  CV: RRR with no murmur; normal pulse; normal S1 and S2; no pedal edema  CHEST: Normal respiratory effort; CTAB; normal breath sounds; no wheeze or crackles  ABDOM: nontender and nondistended; soft; no rebound/guarding, L/S NP  MUSC/Skeletal: ROM normal; no crepitus; joints normal  EXTREM: no clubbing, cyanosis, inflammation or swelling  SKIN: no rashes, lesions, ulcers, petechiae  : no cvat  NEURO: grossly  intact; motor/sensory WNL; no tremors  PSYCH: normal mood, affect and behavior  LYMPH: normal cervical, supraclavicular, axillary LN's  BREASTS: L chest NT, incisions closed, no palpable mass.  R breast NT, no palpable mass.      Labs:   Lab Results   Component Value Date    WBC 9.08 09/15/2023    HGB 17.0 09/15/2023    HCT 49.9 09/15/2023    MCV 98 09/15/2023     09/15/2023    CMP  Sodium   Date Value Ref Range Status   09/15/2023 141 136 - 145 mmol/L Final     Potassium   Date Value Ref Range Status   09/15/2023 4.9 3.5 - 5.1 mmol/L Final     Chloride   Date Value Ref Range Status   09/15/2023 107 95 - 110 mmol/L Final     CO2   Date Value Ref Range Status   09/15/2023 24 23 - 29 mmol/L Final     Glucose   Date Value Ref Range Status   09/15/2023 93 70 - 110 mg/dL Final     BUN   Date Value Ref Range Status   09/15/2023 23 8 - 23 mg/dL Final     Creatinine   Date Value Ref Range Status   09/15/2023 1.1 0.5 - 1.4 mg/dL Final     Calcium   Date Value Ref Range Status   09/15/2023 9.5 8.7 - 10.5 mg/dL Final     Total Protein   Date Value Ref Range Status   09/15/2023 7.3 6.0 - 8.4 g/dL Final     Albumin   Date Value Ref Range Status   09/15/2023 4.3 3.5 - 5.2 g/dL Final     Total Bilirubin   Date Value Ref Range Status   09/15/2023 0.8 0.1 - 1.0 mg/dL Final     Comment:     For infants and newborns, interpretation of results should be based  on gestational age, weight and in agreement with clinical  observations.    Premature Infant recommended reference ranges:  Up to 24 hours.............<8.0 mg/dL  Up to 48 hours............<12.0 mg/dL  3-5 days..................<15.0 mg/dL  6-29 days.................<15.0 mg/dL       Alkaline Phosphatase   Date Value Ref Range Status   09/15/2023 66 55 - 135 U/L Final     AST   Date Value Ref Range Status   09/15/2023 24 10 - 40 U/L Final     ALT   Date Value Ref Range Status   09/15/2023 22 10 - 44 U/L Final     Anion Gap   Date Value Ref Range Status   09/15/2023 10 8 -  16 mmol/L Final     eGFR if    Date Value Ref Range Status   07/18/2022 >60.0 >60 mL/min/1.73 m^2 Final     eGFR if non    Date Value Ref Range Status   07/18/2022 >60.0 >60 mL/min/1.73 m^2 Final     Comment:     Calculation used to obtain the estimated glomerular filtration  rate (eGFR) is the CKD-EPI equation.        PSA 1.85  TSH 2.1  CBC, CMP, tumor markers NL.  Assessment:   (1) 73 y.o. 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  ERP+, PRP+, H2N neg.  Fish 1+ positive, this is now called H2N low.  Local invasion into L breast nipple epidermis or skin without ulceration.  Stage 2 dx.    (2)Oncotype dx testing.  See discussion in HPI above.    (3)BrCa1&2 testing, was negative.  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  See discussion in HPI above.    R breast Mamm 2/17/2023. Negative. Re check mamm/u/s after 2/17/24.  [facility].    RTC 3/2024.

## 2024-02-20 ENCOUNTER — LAB VISIT (OUTPATIENT)
Dept: LAB | Facility: HOSPITAL | Age: 74
End: 2024-02-20
Attending: INTERNAL MEDICINE
Payer: MEDICARE

## 2024-02-20 DIAGNOSIS — C50.022 MALIGNANT NEOPLASM OF NIPPLE OF LEFT BREAST IN MALE, ESTROGEN RECEPTOR POSITIVE: ICD-10-CM

## 2024-02-20 DIAGNOSIS — Z17.0 MALIGNANT NEOPLASM OF NIPPLE OF LEFT BREAST IN MALE, ESTROGEN RECEPTOR POSITIVE: ICD-10-CM

## 2024-02-20 LAB
ALBUMIN SERPL BCP-MCNC: 3.7 G/DL (ref 3.5–5.2)
ALP SERPL-CCNC: 65 U/L (ref 55–135)
ALT SERPL W/O P-5'-P-CCNC: 27 U/L (ref 10–44)
ANION GAP SERPL CALC-SCNC: 10 MMOL/L (ref 8–16)
AST SERPL-CCNC: 26 U/L (ref 10–40)
BASOPHILS # BLD AUTO: 0.02 K/UL (ref 0–0.2)
BASOPHILS NFR BLD: 0.3 % (ref 0–1.9)
BILIRUB SERPL-MCNC: 0.7 MG/DL (ref 0.1–1)
BUN SERPL-MCNC: 19 MG/DL (ref 8–23)
CALCIUM SERPL-MCNC: 9.3 MG/DL (ref 8.7–10.5)
CHLORIDE SERPL-SCNC: 105 MMOL/L (ref 95–110)
CO2 SERPL-SCNC: 25 MMOL/L (ref 23–29)
CREAT SERPL-MCNC: 1.1 MG/DL (ref 0.5–1.4)
DIFFERENTIAL METHOD BLD: ABNORMAL
EOSINOPHIL # BLD AUTO: 0.1 K/UL (ref 0–0.5)
EOSINOPHIL NFR BLD: 1.7 % (ref 0–8)
ERYTHROCYTE [DISTWIDTH] IN BLOOD BY AUTOMATED COUNT: 13.2 % (ref 11.5–14.5)
EST. GFR  (NO RACE VARIABLE): >60 ML/MIN/1.73 M^2
GLUCOSE SERPL-MCNC: 99 MG/DL (ref 70–110)
HCT VFR BLD AUTO: 53.8 % (ref 40–54)
HGB BLD-MCNC: 17.5 G/DL (ref 14–18)
IMM GRANULOCYTES # BLD AUTO: 0.01 K/UL (ref 0–0.04)
IMM GRANULOCYTES NFR BLD AUTO: 0.2 % (ref 0–0.5)
LYMPHOCYTES # BLD AUTO: 1.3 K/UL (ref 1–4.8)
LYMPHOCYTES NFR BLD: 19.2 % (ref 18–48)
MCH RBC QN AUTO: 32.5 PG (ref 27–31)
MCHC RBC AUTO-ENTMCNC: 32.5 G/DL (ref 32–36)
MCV RBC AUTO: 100 FL (ref 82–98)
MONOCYTES # BLD AUTO: 0.5 K/UL (ref 0.3–1)
MONOCYTES NFR BLD: 7.6 % (ref 4–15)
NEUTROPHILS # BLD AUTO: 4.7 K/UL (ref 1.8–7.7)
NEUTROPHILS NFR BLD: 71 % (ref 38–73)
NRBC BLD-RTO: 0 /100 WBC
PLATELET # BLD AUTO: 186 K/UL (ref 150–450)
PMV BLD AUTO: 10.8 FL (ref 9.2–12.9)
POTASSIUM SERPL-SCNC: 4.8 MMOL/L (ref 3.5–5.1)
PROT SERPL-MCNC: 7 G/DL (ref 6–8.4)
RBC # BLD AUTO: 5.38 M/UL (ref 4.6–6.2)
SODIUM SERPL-SCNC: 140 MMOL/L (ref 136–145)
WBC # BLD AUTO: 6.56 K/UL (ref 3.9–12.7)

## 2024-02-20 PROCEDURE — 36415 COLL VENOUS BLD VENIPUNCTURE: CPT | Performed by: INTERNAL MEDICINE

## 2024-02-20 PROCEDURE — 80053 COMPREHEN METABOLIC PANEL: CPT | Performed by: INTERNAL MEDICINE

## 2024-02-20 PROCEDURE — 85025 COMPLETE CBC W/AUTO DIFF WBC: CPT | Performed by: INTERNAL MEDICINE

## 2024-02-22 LAB
CANCER AG15-3 SERPL IA-ACNC: 11 U/ML
CANCER AG27-29 SERPL-ACNC: 13.3 U/ML

## 2024-02-29 ENCOUNTER — HOSPITAL ENCOUNTER (OUTPATIENT)
Dept: RADIOLOGY | Facility: HOSPITAL | Age: 74
Discharge: HOME | End: 2024-02-29
Attending: INTERNAL MEDICINE
Payer: MEDICARE

## 2024-02-29 VITALS — WEIGHT: 176.56 LBS | BODY MASS INDEX: 23.92 KG/M2 | HEIGHT: 72 IN

## 2024-02-29 DIAGNOSIS — Z17.0 MALIGNANT NEOPLASM OF NIPPLE OF LEFT BREAST IN MALE, ESTROGEN RECEPTOR POSITIVE: ICD-10-CM

## 2024-02-29 DIAGNOSIS — C50.022 MALIGNANT NEOPLASM OF NIPPLE OF LEFT BREAST IN MALE, ESTROGEN RECEPTOR POSITIVE: ICD-10-CM

## 2024-02-29 PROCEDURE — 77062 BREAST TOMOSYNTHESIS BI: CPT | Mod: TC,PO

## 2024-02-29 PROCEDURE — 76642 ULTRASOUND BREAST LIMITED: CPT | Mod: TC,50,PO

## 2024-03-22 ENCOUNTER — HOSPITAL ENCOUNTER (OUTPATIENT)
Dept: RADIOLOGY | Facility: HOSPITAL | Age: 74
Discharge: HOME | End: 2024-03-22
Attending: INTERNAL MEDICINE
Payer: MEDICARE

## 2024-03-22 DIAGNOSIS — R92.8 ABNORMAL MAMMOGRAM: ICD-10-CM

## 2024-03-22 PROCEDURE — 25500020 PHARM REV CODE 255: Mod: PO | Performed by: INTERNAL MEDICINE

## 2024-03-22 PROCEDURE — A9585 GADOBUTROL INJECTION: HCPCS | Mod: PO | Performed by: INTERNAL MEDICINE

## 2024-03-22 PROCEDURE — 77049 MRI BREAST C-+ W/CAD BI: CPT | Mod: TC,PO

## 2024-03-22 RX ORDER — GADOBUTROL 604.72 MG/ML
8 INJECTION INTRAVENOUS
Status: COMPLETED | OUTPATIENT
Start: 2024-03-22 | End: 2024-03-22

## 2024-03-22 RX ADMIN — GADOBUTROL 8 ML: 604.72 INJECTION INTRAVENOUS at 09:03

## 2024-03-27 ENCOUNTER — OFFICE VISIT (OUTPATIENT)
Dept: HEMATOLOGY/ONCOLOGY | Facility: CLINIC | Age: 74
End: 2024-03-27
Payer: MEDICARE

## 2024-03-27 VITALS
HEART RATE: 54 BPM | SYSTOLIC BLOOD PRESSURE: 186 MMHG | RESPIRATION RATE: 18 BRPM | TEMPERATURE: 98 F | WEIGHT: 177.19 LBS | BODY MASS INDEX: 24 KG/M2 | DIASTOLIC BLOOD PRESSURE: 95 MMHG | HEIGHT: 72 IN

## 2024-03-27 DIAGNOSIS — Z17.0 MALIGNANT NEOPLASM OF NIPPLE OF LEFT BREAST IN MALE, ESTROGEN RECEPTOR POSITIVE: Primary | ICD-10-CM

## 2024-03-27 DIAGNOSIS — D53.1 OTHER MEGALOBLASTIC ANEMIAS, NOT ELSEWHERE CLASSIFIED: ICD-10-CM

## 2024-03-27 DIAGNOSIS — D75.89 MACROCYTOSIS WITHOUT ANEMIA: ICD-10-CM

## 2024-03-27 DIAGNOSIS — R92.8 ABNORMAL MAMMOGRAM: ICD-10-CM

## 2024-03-27 DIAGNOSIS — C50.022 MALIGNANT NEOPLASM OF NIPPLE OF LEFT BREAST IN MALE, ESTROGEN RECEPTOR POSITIVE: Primary | ICD-10-CM

## 2024-03-27 PROCEDURE — 99214 OFFICE O/P EST MOD 30 MIN: CPT | Mod: S$GLB,,, | Performed by: INTERNAL MEDICINE

## 2024-03-27 NOTE — PROGRESS NOTES
[facility] Hematology Oncology In Office Subsequent Encounter Note    3/27/24    Subjective:      Patient ID:   Perez Kenyon  74 y.o. male  1950  MD Terry Turner MD Sharon Giordano, MD Angela Winfield, MD    Chief Complaint:   Breast cancer    HPI:  74 y.o. male found lump at L breast below the nipple area.  22.  Mamm 2.3 cm mass at retroareolar area.  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  ERP+, PRP+, H2N neg. Ki-67 10%.  L modified radical mastectomy 22   Invasive ductal carcinoma, 2.1 cm, grade 2.  DCIS +.  Invasive carcinoma goes into epidermis, skin, but with out ulceration of nipple.  Surgical margins are clear. 0/8 axillary LN are positive.    He has met with Dr. Madelin Arteaga of [facility]., male breast cancer specialist.    Stage 2 Dx by primary size, LN negative.    Oncotype Dx test supports 19% recurrence at 9 years from Dx, when treated with Tamoxifen or Arimidex adjuvantly.  Recurrence Score was 30.  Oncotype Dx supported that adjuvant chemotherapy in addition to Endocrine or hormonal therapy would decrease recurrence risk further.  I would estimate down to 12-13%  Over 9 years of follow up.    I discussed adjuvant Tamoxifen or Arimidex with him x's 5-10 years, and adjuvant chemotherapy trial q 3 weeks x's 4-6 cycles to try decrease RR per Oncotype Dx reports.  He has decided to go with observation now.  He does not agree to adjuvant hormonal, endocrine or adjuvant chemotherapy Rx.    He has male breast cancer.  BRCA 1 & 2 are negative.  But he is POSITIVE for CHEK2 gene mutation.  Risk of breast cancer may be as high as 20-40% in females.  Also 5-10% risk of colon cancer.  Some studies support increased risk of melanoma and prostate cancer.    He is not interested in prophylactic mastectomy at the other breast.  His parents are . He has no siblings.  He has a son and 3 grand daughters.  The  son can have him self checked to see if he has CHEK2 mutation  Passed down to himself.  I think a 50/50% chance of passing this to his son.    Pt has discussed CHEK-2 mutation with his son, I don't believe the son is being tested.    He has a tooth ache today.  He is to see Dr. Gerald Garrison DDS today for this.  In 6/2023, he had LBP sx, found to have prostatitis, treated with antibiotics x's 3 weeks, sx resolved.  He has BPH, followed per Dr. Raphael of .    CT of abdomin 6/1/23 showed 4 mm LLL nodule, hepatic and renal cysts, enlargement of prostate, no hydronephrosis or lymphadenopathy.    He c/o feeling fullness at L scapula area.  On exam he has good ROM, NT and I do not palpate a mass or nodule.  I recommended CT of chest and posterior chest wall area. This would evaluate his scapular sx and check lung for other nodules?  He did not agree to ordering of the CT.    Last mamm and U/S was 2/17/23, I will order mamm and U/S, tumor markers [facility] after 2/17/24  Tumor markers are NL now.  Mamm., U/S, MRI 3 mm nodule at R breast, suspected intramammary LN..  Will recheck in 6 months.    R rotator cuff surgery 2008.  Last colonoscopy 11/11/20, Dr. Park.    Smoke no, ETOH yes, Allergy no.    Meds pepcid.    Mom CVA, no cancer.  Dad prostate cancer.  1/2 Brother & 1/2 Brother A & W.  Son & Daughter A & W.    ROS:   GEN: normal without any fever, night sweats or weight loss  HEENT: normal with no HA's, sore throat, stiff neck, changes in vision  CV: normal with no CP, SOB, PND, PRADO or orthopnea  PULM: normal with no SOB, cough, hemoptysis, sputum or pleuritic pain  GI: normal with no abdominal pain, nausea, vomiting, constipation, diarrhea, melanotic stools, BRBPR, or hematemesis  : normal with no hematuria, dysuria  BREAST: See HPI  SKIN: normal with no rash, erythema, bruising, or swelling     Past Medical History:   Diagnosis Date    Breast cancer     Cancer     Breast     Past Surgical History:   Procedure  Laterality Date    BIOPSY OF AXILLARY NODE N/A 08/17/2022    Procedure: BIOPSY, LYMPH NODE, AXILLARY;  Surgeon: Terry Tinajero MD;  Location: [facility] OR;  Service: General;  Laterality: N/A;    BREAST BIOPSY      MASTECTOMY N/A 08/17/2022    Procedure: MASTECTOMY;  Surgeon: Terry Tinajero MD;  Location: [facility] OR;  Service: General;  Laterality: N/A;  1) left mastectomy with axillary node dissection 19307  2) injection of Lymphazurin blue dye left chest    ROTATOR CUFF REPAIR Right 2008       Review of patient's allergies indicates:  No Known Allergies    Social History     Socioeconomic History    Marital status:    Occupational History    Occupation: retired   Tobacco Use    Smoking status: Never    Smokeless tobacco: Never   Substance and Sexual Activity    Alcohol use: Yes    Drug use: Not Currently    Sexual activity: Yes     Partners: Female         Current Outpatient Medications:     azithromycin (Z-MECHELLE) 250 MG tablet, Take by mouth., Disp: , Rfl:     famotidine (PEPCID) 20 MG tablet, Take 20 mg by mouth daily as needed., Disp: , Rfl:     levoFLOXacin (LEVAQUIN) 750 MG tablet, Take 750 mg by mouth., Disp: , Rfl:     omeprazole (PRILOSEC) 40 MG capsule, Take 40 mg by mouth., Disp: , Rfl:           Objective:   Vitals:  Blood pressure (!) 186/95, pulse (!) 54, temperature 98 °F (36.7 °C), resp. rate 18, height 6' (1.829 m), weight 80.4 kg (177 lb 3.2 oz).    Physical Examination:   GEN: no apparent distress, comfortable  HEAD: atraumatic and normocephalic  EYES: no pallor, no icterus  ENT: no pharyngeal erythema, external ears WNL; no nasal discharge  NECK: no masses, thyroid normal, trachea midline, no LAD/LN's, supple  CV: RRR with no murmur; normal pulse; normal S1 and S2; no pedal edema  CHEST: Normal respiratory effort; CTAB; normal breath sounds; no wheeze or crackles  ABDOM: nontender and nondistended; soft; no rebound/guarding, L/S NP  MUSC/Skeletal: ROM normal; no crepitus; joints  normal  EXTREM: no clubbing, cyanosis, inflammation or swelling  SKIN: no rashes, lesions, ulcers, petechiae  : no cvat  NEURO: grossly intact; motor/sensory WNL; no tremors  PSYCH: normal mood, affect and behavior  LYMPH: normal cervical, supraclavicular, axillary LN's  BREASTS: L chest NT, incisions closed, no palpable mass.  R breast NT, no palpable mass.      Labs:   Lab Results   Component Value Date    WBC 6.56 02/20/2024    HGB 17.5 02/20/2024    HCT 53.8 02/20/2024     (H) 02/20/2024     02/20/2024    CMP  Sodium   Date Value Ref Range Status   02/20/2024 140 136 - 145 mmol/L Final     Potassium   Date Value Ref Range Status   02/20/2024 4.8 3.5 - 5.1 mmol/L Final     Chloride   Date Value Ref Range Status   02/20/2024 105 95 - 110 mmol/L Final     CO2   Date Value Ref Range Status   02/20/2024 25 23 - 29 mmol/L Final     Glucose   Date Value Ref Range Status   02/20/2024 99 70 - 110 mg/dL Final     BUN   Date Value Ref Range Status   02/20/2024 19 8 - 23 mg/dL Final     Creatinine   Date Value Ref Range Status   02/20/2024 1.1 0.5 - 1.4 mg/dL Final     Calcium   Date Value Ref Range Status   02/20/2024 9.3 8.7 - 10.5 mg/dL Final     Total Protein   Date Value Ref Range Status   02/20/2024 7.0 6.0 - 8.4 g/dL Final     Albumin   Date Value Ref Range Status   02/20/2024 3.7 3.5 - 5.2 g/dL Final     Total Bilirubin   Date Value Ref Range Status   02/20/2024 0.7 0.1 - 1.0 mg/dL Final     Comment:     For infants and newborns, interpretation of results should be based  on gestational age, weight and in agreement with clinical  observations.    Premature Infant recommended reference ranges:  Up to 24 hours.............<8.0 mg/dL  Up to 48 hours............<12.0 mg/dL  3-5 days..................<15.0 mg/dL  6-29 days.................<15.0 mg/dL       Alkaline Phosphatase   Date Value Ref Range Status   02/20/2024 65 55 - 135 U/L Final     AST   Date Value Ref Range Status   02/20/2024 26 10 - 40 U/L  Final     ALT   Date Value Ref Range Status   02/20/2024 27 10 - 44 U/L Final     Anion Gap   Date Value Ref Range Status   02/20/2024 10 8 - 16 mmol/L Final     eGFR if    Date Value Ref Range Status   07/18/2022 >60.0 >60 mL/min/1.73 m^2 Final     eGFR if non    Date Value Ref Range Status   07/18/2022 >60.0 >60 mL/min/1.73 m^2 Final     Comment:     Calculation used to obtain the estimated glomerular filtration  rate (eGFR) is the CKD-EPI equation.        PSA 1.85  TSH 2.1  CBC, CMP, tumor markers NL.  Assessment:   (1) 74 y.o. 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  ERP+, PRP+, H2N neg.  Fish 1+ positive, this is now called H2N low.  Local invasion into L breast nipple epidermis or skin without ulceration.  Stage 2 dx.    (2)Oncotype dx testing.  See discussion in HPI above.    (3)BrCa1&2 testing, was negative.  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  See discussion in HPI above.    Current Mamm, u/s, MRI  3 mm upper outer R breast nodule, suspected intramammary LN.  Check U/S R breast in 6 months.  RTC 6 months.

## 2024-03-27 NOTE — LETTER
March 30, 2024        Alton Maguire MD  [address]  [facility] [place] [address]             [facility] - Hematology Oncology  [address] [place] [address]  Phone: [phone]  Fax: [phone]   Patient: Perez Kenyon   MR Number: [id number]   YOB: 1950   Date of Visit: 3/27/2024       Dear Dr. Maguire:    Thank you for referring Perez Kenyon to me for evaluation. Below are the relevant portions of my assessment and plan of care.            If you have questions, please do not hesitate to call me. I look forward to following Perez along with you.    Sincerely,      MYRON Barraza MD           CC    No Recipients

## 2024-04-12 ENCOUNTER — TELEPHONE (OUTPATIENT)
Dept: HEMATOLOGY/ONCOLOGY | Facility: CLINIC | Age: 74
End: 2024-04-12

## 2024-05-14 DIAGNOSIS — M54.59 OTHER LOW BACK PAIN: Primary | ICD-10-CM

## 2024-05-14 DIAGNOSIS — M54.16 LUMBAR RADICULOPATHY: ICD-10-CM

## 2024-05-22 ENCOUNTER — HOSPITAL ENCOUNTER (OUTPATIENT)
Dept: RADIOLOGY | Facility: HOSPITAL | Age: 74
Discharge: HOME | End: 2024-05-22
Attending: FAMILY MEDICINE
Payer: MEDICARE

## 2024-05-22 DIAGNOSIS — M54.59 OTHER LOW BACK PAIN: ICD-10-CM

## 2024-05-22 DIAGNOSIS — M54.16 LUMBAR RADICULOPATHY: ICD-10-CM

## 2024-05-22 PROCEDURE — 72148 MRI LUMBAR SPINE W/O DYE: CPT | Mod: TC,PO

## 2024-05-22 PROCEDURE — 72148 MRI LUMBAR SPINE W/O DYE: CPT | Mod: 26,,, | Performed by: RADIOLOGY

## 2024-08-26 NOTE — PROGRESS NOTES
[facility] Hematology Oncology In Office Subsequent Encounter Note    8/27/24    Subjective:      Patient ID:   Perez Kenyon  74 y.o. male  1950  MD Terry Turner MD Sharon Giordano, MD Angela Winfield, MD    Chief Complaint:   Breast cancer    HPI:    73 yo male with left breat cancer here for add on to assess lump he recently found on the left upper back. He states he feels like the lump is getting larger and painful. There is no redness or swelling.    He also found an abnormal skin lesion on his left thigh and is seeing derm for evaluation.    Per Dr. Cassidy's Previous note:  74 y.o. male found lump at L breast below the nipple area.  7/12/22.  Mamm 2.3 cm mass at retroareolar area.  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  ERP+, PRP+, H2N neg. Ki-67 10%.  L modified radical mastectomy 8/17/22   Invasive ductal carcinoma, 2.1 cm, grade 2.  DCIS +.  Invasive carcinoma goes into epidermis, skin, but with out ulceration of nipple.  Surgical margins are clear. 0/8 axillary LN are positive.    He has met with Dr. Madelin Arteaga of [facility]., male breast cancer specialist.    Stage 2 Dx by primary size, LN negative.    Oncotype Dx test supports 19% recurrence at 9 years from Dx, when treated with Tamoxifen or Arimidex adjuvantly.  Recurrence Score was 30.  Oncotype Dx supported that adjuvant chemotherapy in addition to Endocrine or hormonal therapy would decrease recurrence risk further.  I would estimate down to 12-13%  Over 9 years of follow up.    I discussed adjuvant Tamoxifen or Arimidex with him x's 5-10 years, and adjuvant chemotherapy trial q 3 weeks x's 4-6 cycles to try decrease RR per Oncotype Dx reports.  He has decided to go with observation now.  He does not agree to adjuvant hormonal, endocrine or adjuvant chemotherapy Rx.    He has male breast cancer.  BRCA 1 & 2 are negative.  But he is POSITIVE for CHEK2  gene mutation.  Risk of breast cancer may be as high as 20-40% in females.  Also 5-10% risk of colon cancer.  Some studies support increased risk of melanoma and prostate cancer.    He is not interested in prophylactic mastectomy at the other breast.  His parents are . He has no siblings.  He has a son and 3 grand daughters.  The son can have him self checked to see if he has CHEK2 mutation  Passed down to himself.  I think a 50/50% chance of passing this to his son.    Pt has discussed CHEK-2 mutation with his son, I don't believe the son is being tested.    He has a tooth ache today.  He is to see Dr. Gerald Garrison, [facility] today for this.  In 2023, he had LBP sx, found to have prostatitis, treated with antibiotics x's 3 weeks, sx resolved.  He has BPH, followed per Dr. Raphael of .    CT of abdomin 23 showed 4 mm LLL nodule, hepatic and renal cysts, enlargement of prostate, no hydronephrosis or lymphadenopathy.    He c/o feeling fullness at L scapula area.  On exam he has good ROM, NT and I do not palpate a mass or nodule.  I recommended CT of chest and posterior chest wall area. This would evaluate his scapular sx and check lung for other nodules?  He did not agree to ordering of the CT.    Last mamm and U/S was 23, I will order mamm and U/S, tumor markers [facility] after 24  Tumor markers are NL now.  Mamm., U/S, MRI 3 mm nodule at R breast, suspected intramammary LN..  Will recheck in 6 months.    R rotator cuff surgery .  Last colonoscopy 20, Dr. Park.    Smoke no, ETOH yes, Allergy no.    Meds pepcid.    Mom CVA, no cancer.  Dad prostate cancer.  1/2 Brother & 1/2 Brother A & W.  Son & Daughter A & W.    ROS:   Review of Systems   Constitutional:  Negative for malaise/fatigue.   Respiratory:  Negative for cough and shortness of breath.    Cardiovascular:  Negative for chest pain.   Gastrointestinal:  Negative for abdominal pain and diarrhea.   Genitourinary:  Negative for  frequency.   Musculoskeletal:  Positive for back pain.   Skin:  Negative for rash.   Neurological:  Negative for headaches.   Psychiatric/Behavioral:  The patient is not nervous/anxious.        Past Medical History:   Diagnosis Date    Breast cancer     Cancer     Breast     Past Surgical History:   Procedure Laterality Date    BIOPSY OF AXILLARY NODE N/A 08/17/2022    Procedure: BIOPSY, LYMPH NODE, AXILLARY;  Surgeon: Terry Tinajero MD;  Location: [facility];  Service: General;  Laterality: N/A;    BREAST BIOPSY      MASTECTOMY N/A 08/17/2022    Procedure: MASTECTOMY;  Surgeon: Terry Tinajero MD;  Location: [facility] OR;  Service: General;  Laterality: N/A;  1) left mastectomy with axillary node dissection 19307  2) injection of Lymphazurin blue dye left chest    ROTATOR CUFF REPAIR Right 2008       Review of patient's allergies indicates:  No Known Allergies    Social History     Socioeconomic History    Marital status:    Occupational History    Occupation: retired   Tobacco Use    Smoking status: Never    Smokeless tobacco: Never   Substance and Sexual Activity    Alcohol use: Yes    Drug use: Not Currently    Sexual activity: Yes     Partners: Female     Social Determinants of Health     Financial Resource Strain: Low Risk  (8/25/2024)    Overall Financial Resource Strain (CARDIA)     Difficulty of Paying Living Expenses: Not very hard   Food Insecurity: No Food Insecurity (8/25/2024)    Hunger Vital Sign     Worried About Running Out of Food in the Last Year: Never true     Ran Out of Food in the Last Year: Never true   Physical Activity: Sufficiently Active (8/25/2024)    Exercise Vital Sign     Days of Exercise per Week: 6 days     Minutes of Exercise per Session: 60 min   Stress: No Stress Concern Present (8/25/2024)    [employer] - Occupational Stress Questionnaire     Feeling of Stress : Not at all   Housing Stability: Unknown (8/25/2024)    Housing Stability  Vital Sign     Unable to Pay for Housing in the Last Year: No         Current Outpatient Medications:     famotidine (PEPCID) 20 MG tablet, Take 20 mg by mouth daily as needed., Disp: , Rfl:     omeprazole (PRILOSEC) 40 MG capsule, Take 40 mg by mouth., Disp: , Rfl:           Objective:   Vitals:  Blood pressure (!) 187/91, pulse (!) 57, temperature 97.8 °F (36.6 °C), resp. rate 16, weight 81.6 kg (180 lb).    Physical Examination:   Physical Exam  Constitutional:       Appearance: Normal appearance.   HENT:      Right Ear: External ear normal.      Left Ear: External ear normal.      Nose: Nose normal.      Mouth/Throat:      Mouth: Mucous membranes are moist.   Eyes:      Pupils: Pupils are equal, round, and reactive to light.   Cardiovascular:      Rate and Rhythm: Normal rate and regular rhythm.   Pulmonary:      Effort: Pulmonary effort is normal.      Breath sounds: Normal breath sounds.       Chest:   Breasts:     Right: Normal.      Left: Absent.       Abdominal:      General: Abdomen is flat.      Palpations: Abdomen is soft.   Musculoskeletal:      Right lower leg: No edema.      Left lower leg: No edema.   Lymphadenopathy:      Upper Body:      Right upper body: No supraclavicular, axillary or pectoral adenopathy.      Left upper body: No supraclavicular, axillary or pectoral adenopathy.   Skin:     General: Skin is warm and dry.   Neurological:      General: No focal deficit present.      Mental Status: He is alert and oriented to person, place, and time.   Psychiatric:         Mood and Affect: Mood normal.         Thought Content: Thought content normal.         Judgment: Judgment normal.       Labs:   Lab Results   Component Value Date    WBC 6.56 02/20/2024    HGB 17.5 02/20/2024    HCT 53.8 02/20/2024     (H) 02/20/2024     02/20/2024    CMP  Sodium   Date Value Ref Range Status   02/20/2024 140 136 - 145 mmol/L Final     Potassium   Date Value Ref Range Status   02/20/2024 4.8 3.5 -  5.1 mmol/L Final     Chloride   Date Value Ref Range Status   02/20/2024 105 95 - 110 mmol/L Final     CO2   Date Value Ref Range Status   02/20/2024 25 23 - 29 mmol/L Final     Glucose   Date Value Ref Range Status   02/20/2024 99 70 - 110 mg/dL Final     BUN   Date Value Ref Range Status   02/20/2024 19 8 - 23 mg/dL Final     Creatinine   Date Value Ref Range Status   02/20/2024 1.1 0.5 - 1.4 mg/dL Final     Calcium   Date Value Ref Range Status   02/20/2024 9.3 8.7 - 10.5 mg/dL Final     Total Protein   Date Value Ref Range Status   02/20/2024 7.0 6.0 - 8.4 g/dL Final     Albumin   Date Value Ref Range Status   02/20/2024 3.7 3.5 - 5.2 g/dL Final     Total Bilirubin   Date Value Ref Range Status   02/20/2024 0.7 0.1 - 1.0 mg/dL Final     Comment:     For infants and newborns, interpretation of results should be based  on gestational age, weight and in agreement with clinical  observations.    Premature Infant recommended reference ranges:  Up to 24 hours.............<8.0 mg/dL  Up to 48 hours............<12.0 mg/dL  3-5 days..................<15.0 mg/dL  6-29 days.................<15.0 mg/dL       Alkaline Phosphatase   Date Value Ref Range Status   02/20/2024 65 55 - 135 U/L Final     AST   Date Value Ref Range Status   02/20/2024 26 10 - 40 U/L Final     ALT   Date Value Ref Range Status   02/20/2024 27 10 - 44 U/L Final     Anion Gap   Date Value Ref Range Status   02/20/2024 10 8 - 16 mmol/L Final     eGFR if    Date Value Ref Range Status   07/18/2022 >60.0 >60 mL/min/1.73 m^2 Final     eGFR if non    Date Value Ref Range Status   07/18/2022 >60.0 >60 mL/min/1.73 m^2 Final     Comment:     Calculation used to obtain the estimated glomerular filtration  rate (eGFR) is the CKD-EPI equation.        PSA 1.85  TSH 2.1  CBC, CMP, tumor markers NL.  Assessment:   (1) 74 y.o. 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  ERP+, PRP+, H2N neg.   Fish 1+ positive, this is now called H2N low.  Local invasion into L breast nipple epidermis or skin without ulceration.  Stage 2 dx.    (2)Oncotype dx testing.  See discussion in HPI above.    (3)BrCa1&2 testing, was negative.  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  See discussion in HPI above.  Patient to see when he is due for colonoscopy with CHEK 2 should be every 5 years.    (4) Upper left back lesion- Add Upper back soft tissue US for further evaluiation      Current Mamm, u/s, MRI  3 mm upper outer R breast nodule, suspected intramammary LN.  US Right breast as scheduled. Up to date Tumor Markers.    (5) HTN- Follow up with PCP    Electronically signed by Elizabeth Jang, MSN, APRN, AGNP-C, OCN

## 2024-08-27 ENCOUNTER — OFFICE VISIT (OUTPATIENT)
Facility: CLINIC | Age: 74
End: 2024-08-27
Payer: MEDICARE

## 2024-08-27 ENCOUNTER — TELEPHONE (OUTPATIENT)
Facility: CLINIC | Age: 74
End: 2024-08-27
Payer: MEDICARE

## 2024-08-27 VITALS
RESPIRATION RATE: 16 BRPM | SYSTOLIC BLOOD PRESSURE: 187 MMHG | WEIGHT: 180 LBS | BODY MASS INDEX: 24.41 KG/M2 | TEMPERATURE: 98 F | DIASTOLIC BLOOD PRESSURE: 91 MMHG | HEART RATE: 57 BPM

## 2024-08-27 DIAGNOSIS — I10 HYPERTENSION, UNSPECIFIED TYPE: ICD-10-CM

## 2024-08-27 DIAGNOSIS — Z15.89 CHEK2 GENE MUTATION POSITIVE: ICD-10-CM

## 2024-08-27 DIAGNOSIS — C50.122 MALIGNANT NEOPLASM OF CENTRAL PORTION OF LEFT BREAST IN MALE, ESTROGEN RECEPTOR POSITIVE: Primary | ICD-10-CM

## 2024-08-27 DIAGNOSIS — Z17.0 MALIGNANT NEOPLASM OF CENTRAL PORTION OF LEFT BREAST IN MALE, ESTROGEN RECEPTOR POSITIVE: Primary | ICD-10-CM

## 2024-08-27 DIAGNOSIS — L98.9 BACK SKIN LESION: ICD-10-CM

## 2024-08-27 PROBLEM — Z86.0101 H/O ADENOMATOUS POLYP OF COLON: Status: ACTIVE | Noted: 2024-08-27

## 2024-08-27 PROBLEM — Z86.010 H/O ADENOMATOUS POLYP OF COLON: Status: ACTIVE | Noted: 2024-08-27

## 2024-08-27 PROCEDURE — 99213 OFFICE O/P EST LOW 20 MIN: CPT | Mod: PBBFAC,PN | Performed by: NURSE PRACTITIONER

## 2024-08-27 PROCEDURE — 99999 PR PBB SHADOW E&M-EST. PATIENT-LVL III: CPT | Mod: PBBFAC,,, | Performed by: NURSE PRACTITIONER

## 2024-08-27 NOTE — TELEPHONE ENCOUNTER
Per Elizabeth Jang, NP-C's verbal order, patient to monitor blood pressure at home and to make a log to bring to his PCP. Patient made aware of above and verbalized understanding.

## 2024-08-28 ENCOUNTER — LAB VISIT (OUTPATIENT)
Dept: LAB | Facility: HOSPITAL | Age: 74
End: 2024-08-28
Attending: INTERNAL MEDICINE
Payer: MEDICARE

## 2024-08-28 DIAGNOSIS — Z17.0 MALIGNANT NEOPLASM OF NIPPLE OF LEFT BREAST IN MALE, ESTROGEN RECEPTOR POSITIVE: ICD-10-CM

## 2024-08-28 DIAGNOSIS — C50.022 MALIGNANT NEOPLASM OF NIPPLE OF LEFT BREAST IN MALE, ESTROGEN RECEPTOR POSITIVE: ICD-10-CM

## 2024-08-28 PROCEDURE — 36415 COLL VENOUS BLD VENIPUNCTURE: CPT | Performed by: INTERNAL MEDICINE

## 2024-08-30 LAB
CANCER AG15-3 SERPL IA-ACNC: 9.1 U/ML
CANCER AG27-29 SERPL-ACNC: <12 U/ML

## 2024-09-17 ENCOUNTER — HOSPITAL ENCOUNTER (OUTPATIENT)
Dept: RADIOLOGY | Facility: HOSPITAL | Age: 74
Discharge: HOME | End: 2024-09-17
Attending: NURSE PRACTITIONER
Payer: MEDICARE

## 2024-09-17 ENCOUNTER — TELEPHONE (OUTPATIENT)
Facility: CLINIC | Age: 74
End: 2024-09-17
Payer: MEDICARE

## 2024-09-17 ENCOUNTER — HOSPITAL ENCOUNTER (OUTPATIENT)
Dept: RADIOLOGY | Facility: HOSPITAL | Age: 74
Discharge: HOME | End: 2024-09-17
Attending: INTERNAL MEDICINE
Payer: MEDICARE

## 2024-09-17 DIAGNOSIS — C50.022 MALIGNANT NEOPLASM OF NIPPLE OF LEFT BREAST IN MALE, ESTROGEN RECEPTOR POSITIVE: ICD-10-CM

## 2024-09-17 DIAGNOSIS — C50.122 MALIGNANT NEOPLASM OF CENTRAL PORTION OF LEFT BREAST IN MALE, ESTROGEN RECEPTOR POSITIVE: Primary | ICD-10-CM

## 2024-09-17 DIAGNOSIS — Z17.0 MALIGNANT NEOPLASM OF CENTRAL PORTION OF LEFT BREAST IN MALE, ESTROGEN RECEPTOR POSITIVE: ICD-10-CM

## 2024-09-17 DIAGNOSIS — Z17.0 MALIGNANT NEOPLASM OF CENTRAL PORTION OF LEFT BREAST IN MALE, ESTROGEN RECEPTOR POSITIVE: Primary | ICD-10-CM

## 2024-09-17 DIAGNOSIS — R92.8 ABNORMAL MAMMOGRAM: ICD-10-CM

## 2024-09-17 DIAGNOSIS — Z17.0 MALIGNANT NEOPLASM OF NIPPLE OF LEFT BREAST IN MALE, ESTROGEN RECEPTOR POSITIVE: ICD-10-CM

## 2024-09-17 DIAGNOSIS — C50.122 MALIGNANT NEOPLASM OF CENTRAL PORTION OF LEFT BREAST IN MALE, ESTROGEN RECEPTOR POSITIVE: ICD-10-CM

## 2024-09-17 DIAGNOSIS — L98.9 BACK SKIN LESION: ICD-10-CM

## 2024-09-17 PROCEDURE — 76604 US EXAM CHEST: CPT | Mod: TC,PO

## 2024-09-17 PROCEDURE — 76604 US EXAM CHEST: CPT | Mod: 26,,, | Performed by: RADIOLOGY

## 2024-09-17 PROCEDURE — 76641 ULTRASOUND BREAST COMPLETE: CPT | Mod: TC,PO,RT

## 2024-09-17 PROCEDURE — 76641 ULTRASOUND BREAST COMPLETE: CPT | Mod: 26,RT,, | Performed by: RADIOLOGY

## 2024-09-17 NOTE — TELEPHONE ENCOUNTER
----- Message from JOSELIN Magaña sent at 9/17/2024  9:13 AM CDT -----  Please notify the patient that their US shows possible cyst as we discuss but cannont rule out cancer so send him to general surg for further evaluation.   [Dear  ___] : Dear  [unfilled], [Consult Letter:] : I had the pleasure of evaluating your patient, [unfilled]. [Please see my note below.] : Please see my note below. [Consult Closing:] : Thank you very much for allowing me to participate in the care of this patient.  If you have any questions, please do not hesitate to contact me. [Sincerely,] : Sincerely, [FreeTextEntry3] : Marin Kruse MD, FACP \par  of Medicine \par Division of Hematology/Oncology\par [facility]\par [facility] \par [address]\[place] [address]\par Tel: [phone]\par Fax: [phone]\par \par \par

## 2024-09-17 NOTE — PROGRESS NOTES
Please notify the patient that their US shows possible cyst as we discuss but varun rule out cancer so send him to general surg for further evaluation.

## 2024-09-18 ENCOUNTER — OFFICE VISIT (OUTPATIENT)
Dept: SURGERY | Facility: CLINIC | Age: 74
End: 2024-09-18
Payer: MEDICARE

## 2024-09-18 VITALS — DIASTOLIC BLOOD PRESSURE: 84 MMHG | SYSTOLIC BLOOD PRESSURE: 169 MMHG | HEART RATE: 64 BPM | TEMPERATURE: 98 F

## 2024-09-18 DIAGNOSIS — C43.59 MALIGNANT MELANOMA OF UPPER BACK: ICD-10-CM

## 2024-09-18 DIAGNOSIS — L72.0 EPIDERMOID CYST OF SKIN OF BACK: Primary | ICD-10-CM

## 2024-09-18 PROCEDURE — 99213 OFFICE O/P EST LOW 20 MIN: CPT | Mod: S$PBB,,, | Performed by: SURGERY

## 2024-09-18 PROCEDURE — 99212 OFFICE O/P EST SF 10 MIN: CPT | Mod: PBBFAC,PN | Performed by: SURGERY

## 2024-09-18 PROCEDURE — 99999 PR PBB SHADOW E&M-EST. PATIENT-LVL II: CPT | Mod: PBBFAC,,, | Performed by: SURGERY

## 2024-09-18 RX ORDER — IBUPROFEN 600 MG/1
TABLET ORAL
COMMUNITY
Start: 2024-05-06

## 2024-09-18 NOTE — H&P
GENERAL SURGERY  OUTPATIENT H&P    REASON FOR VISIT/CC: Cyst of the upper back    HPI: Perez Kenyon is a 74 y.o. male referred for evaluation a cyst in the upper back. Patient has a history of left-sided breast cancer status post mastectomy. It was found to have lump in the upper back and underwent ultrasound by his oncologist. This returned as suspicious cyst. He was referred to General surgery for evaluation. In the meantime patient did meet with his dermatologist who performed a shave biopsy of a pigmented lesion just above the cystic lesion. He has been contact and it was planning to meet with his dermatologist next week for possible further excision. He denies any significant pain or drainage from the cystic lesion.    I have reviewed the patient's chart including prior progress notes, procedures and testing.     ROS:   Review of Systems    PROBLEM LIST:  Patient Active Problem List   Diagnosis    H/O adenomatous polyp of colon    Malignant neoplasm of central portion of left male breast    CHEK2 gene mutation positive    HTN (hypertension)         HISTORY  Past Medical History:   Diagnosis Date    Breast cancer     Cancer     Breast       Past Surgical History:   Procedure Laterality Date    BIOPSY OF AXILLARY NODE N/A 08/17/2022    Procedure: BIOPSY, LYMPH NODE, AXILLARY;  Surgeon: Terry Tinajero MD;  Location: [facility] OR;  Service: General;  Laterality: N/A;    BREAST BIOPSY      MASTECTOMY N/A 08/17/2022    Procedure: MASTECTOMY;  Surgeon: Terry Tinajero MD;  Location: [facility] OR;  Service: General;  Laterality: N/A;  1) left mastectomy with axillary node dissection 19307  2) injection of Lymphazurin blue dye left chest    ROTATOR CUFF REPAIR Right 2008       Social History     Tobacco Use    Smoking status: Never    Smokeless tobacco: Never   Substance Use Topics    Alcohol use: Yes    Drug use: Not Currently       No family history on file.      MEDS:  Current Outpatient Medications on  File Prior to Visit   Medication Sig Dispense Refill    ibuprofen (ADVIL,MOTRIN) 600 MG tablet TAKE 1 TABLET BY MOUTH THREE TIMES DAILY AFTER A MEAL      [DISCONTINUED] famotidine (PEPCID) 20 MG tablet Take 20 mg by mouth daily as needed.      [DISCONTINUED] omeprazole (PRILOSEC) 40 MG capsule Take 40 mg by mouth.       No current facility-administered medications on file prior to visit.       ALLERGIES:  Review of patient's allergies indicates:  No Known Allergies      VITALS:  Vitals:    09/18/24 1031   BP: (!) 169/84   Pulse: 64   Temp: 97.5 °F (36.4 °C)         PHYSICAL EXAM:  Physical Exam  Vitals reviewed.   Constitutional:       General: He is not in acute distress.     Appearance: Normal appearance. He is well-developed.   HENT:      Head: Normocephalic and atraumatic.   Eyes:      General: No scleral icterus.  Neck:      Trachea: No tracheal deviation.   Cardiovascular:      Rate and Rhythm: Normal rate and regular rhythm.      Pulses: Normal pulses.   Pulmonary:      Effort: Pulmonary effort is normal. No respiratory distress.      Breath sounds: Normal breath sounds.   Abdominal:      General: There is no distension.      Palpations: Abdomen is soft.      Tenderness: There is no abdominal tenderness.   Musculoskeletal:         General: No swelling or tenderness. Normal range of motion.      Cervical back: Normal range of motion and neck supple. No rigidity.   Skin:     General: Skin is warm and dry.      Coloration: Skin is not jaundiced.      Findings: No erythema.             Comments: In the left upper back there it was a 0.75 x 0.75 subcutaneous lesion consistent with a noninfected sebaceous cyst. Proximally 1 cm above this it was an area of recent shave biopsy for apparent melanoma. There is expected changes from shave biopsy but no evidence of pigmented lesion.   Neurological:      General: No focal deficit present.      Mental Status: He is alert and oriented to person, place, and time. He is not  "disoriented.      Motor: No weakness or abnormal muscle tone.   Psychiatric:         Mood and Affect: Mood normal.         Behavior: Behavior normal.         Thought Content: Thought content normal.         Judgment: Judgment normal.           LABS:  Lab Results   Component Value Date    WBC 6.56 02/20/2024    RBC 5.38 02/20/2024    HGB 17.5 02/20/2024    HCT 53.8 02/20/2024     02/20/2024     Lab Results   Component Value Date    GLU 99 02/20/2024     02/20/2024    K 4.8 02/20/2024     02/20/2024    CO2 25 02/20/2024    BUN 19 02/20/2024    CREATININE 1.1 02/20/2024    CALCIUM 9.3 02/20/2024     Lab Results   Component Value Date    ALT 27 02/20/2024    AST 26 02/20/2024    ALKPHOS 65 02/20/2024    BILITOT 0.7 02/20/2024     No results found for: "MG", "PHOS"    STUDIES:  Outside path report from shave biopsy\        Ultrasound upper back    FINDINGS:  Targeted diagnostic ultrasound shows a 6 x 5 x 5 mm round heterogeneous mixed cystic and solid lesion projecting to the skin (claw sign), with no color flow seen, and posterior acoustic enhancement compatible with a complex/complicated cystic structure, adjacent to a previously biopsied site (melanoma).     Impression:     Superficial skin lesion favored to represent a sebaceous/epidermal inclusion cyst, noting malignancy is not excluded, consider clinical follow-up in 6 weeks and if this does not resolve, a repeat ultrasound, FNA/biopsy or contrast enhanced MRI can be performed for further assessment/characterization.         ASSESSMENT & PLAN:  74 y.o. male with malignant melanoma of the upper back, non infected sebaceous cyst of the upper back  -patient was initially reason for visit was for the sebaceous cyst of the upper back   -however given the pathological findings on shave biopsy showed malignant melanoma requiring further surgical excision will defer sebaceous cyst removal at this time   -patient was meeting with his dermatologist for " additional procedures, they can likely remove the cyst at time of wide local excision, if however they could not perform this procedure or we would recommend excision for surgical margins of the melanoma in the OR patient come back to our office and we can arrange for intervention in the OR   -patient understands the above plan and will contact us if he has any questions or concerns

## 2024-09-26 ENCOUNTER — OFFICE VISIT (OUTPATIENT)
Facility: CLINIC | Age: 74
End: 2024-09-26
Payer: MEDICARE

## 2024-09-26 VITALS
BODY MASS INDEX: 24.41 KG/M2 | DIASTOLIC BLOOD PRESSURE: 79 MMHG | OXYGEN SATURATION: 97 % | RESPIRATION RATE: 14 BRPM | WEIGHT: 180.19 LBS | HEIGHT: 72 IN | SYSTOLIC BLOOD PRESSURE: 164 MMHG | TEMPERATURE: 97 F | HEART RATE: 60 BPM

## 2024-09-26 DIAGNOSIS — Z12.39 BREAST CANCER SCREENING OTHER THAN MAMMOGRAM: ICD-10-CM

## 2024-09-26 DIAGNOSIS — R92.8 ABNORMAL MRI, BREAST: ICD-10-CM

## 2024-09-26 DIAGNOSIS — Z17.0 MALIGNANT NEOPLASM OF CENTRAL PORTION OF LEFT BREAST IN MALE, ESTROGEN RECEPTOR POSITIVE: Primary | ICD-10-CM

## 2024-09-26 DIAGNOSIS — C50.122 MALIGNANT NEOPLASM OF CENTRAL PORTION OF LEFT BREAST IN MALE, ESTROGEN RECEPTOR POSITIVE: Primary | ICD-10-CM

## 2024-09-26 PROCEDURE — 99999 PR PBB SHADOW E&M-EST. PATIENT-LVL IV: CPT | Mod: PBBFAC,,, | Performed by: INTERNAL MEDICINE

## 2024-09-26 PROCEDURE — 99214 OFFICE O/P EST MOD 30 MIN: CPT | Mod: PBBFAC,PN | Performed by: INTERNAL MEDICINE

## 2024-09-26 NOTE — PROGRESS NOTES
[facility] [address] Hematology Oncology In Office Subsequent Encounter Note    10/7/24    Subjective:      Patient ID:   Perez Kenyon  74 y.o. male  1950    MD Madelin Gonsalez MD Angela Winfield, MD    Chief Complaint:   Breast cancer    HPI:  74 y.o. male found lump at L breast below the nipple area.  22.  Mamm 2.3 cm mass at retroareolar area.  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  ERP+, PRP+, H2N neg. Ki-67 10%.  L modified radical mastectomy 22   Invasive ductal carcinoma, 2.1 cm, grade 2.  DCIS +.  Invasive carcinoma goes into epidermis, skin, but with out ulceration of nipple.  Surgical margins are clear. 0/8 axillary LN are positive.    He has met with Dr. Madelin Arteaga of [facility] male breast cancer specialist.    Stage 2 Dx by primary size, LN negative.    Oncotype Dx test supports 19% recurrence at 9 years from Dx, when treated with Tamoxifen or Arimidex adjuvantly.  Recurrence Score was 30.  Oncotype Dx supported that adjuvant chemotherapy in addition to Endocrine or hormonal therapy would decrease recurrence risk further.  I would estimate down to 12-13%  Over 9 years of follow up.    I discussed adjuvant Tamoxifen or Arimidex with him x's 5-10 years, and adjuvant chemotherapy trial q 3 weeks x's 4-6 cycles to try decrease RR per Oncotype Dx reports.  He has decided to go with observation now.  He does not agree to adjuvant hormonal, endocrine or adjuvant chemotherapy Rx.    He has male breast cancer.  BRCA 1 & 2 are negative.  But he is POSITIVE for CHEK2 gene mutation.  Risk of breast cancer may be as high as 20-40% in females.  Also 5-10% risk of colon cancer.  Some studies support increased risk of melanoma and prostate cancer.    He is not interested in prophylactic mastectomy at the other breast.  His parents are . He has no siblings.  He has a son and 3 grand daughters.  The son can  have him self checked to see if he has CHEK2 mutation  Passed down to himself.  I think a 50/50% chance of passing this to his son.    Pt has discussed CHEK-2 mutation with his son, I don't believe the son is being tested.    He had a melanoma at upper mid back, Bx 8/30/24.  Surgery 9/23/24.  He has BPH, followed per Dr. Raphael of .  He had sebaceous cyst removed 9/23/24.  Tumor markers now NL.    C/O LBP and hip pain.  Check PET now.  RTC 1 month, can cancel.  RTC 6 months with MRI and TM.    CT of abdomin 6/1/23 showed 4 mm LLL nodule, hepatic and renal cysts, enlargement of prostate, no hydronephrosis or lymphadenopathy.    He c/o feeling fullness at L scapula area.  On exam he has good ROM, NT and I do not palpate a mass or nodule.  I recommended CT of chest and posterior chest wall area. This would evaluate his scapular sx and check lung for other nodules?  He did not agree to ordering of the CT.    Last mamm and U/S was 2/17/23, I [facility] mamm and [facility]  Tumor markers are NL now.  Mamm., U/S, MRI 3 mm nodule at R breast, suspected intramammary LN..  Will recheck in 6 months.    R rotator cuff surgery 2008.  Last colonoscopy 11/11/20, Dr. Park.    Smoke no, ETOH yes, Allergy no.    Meds pepcid.    Mom CVA, no cancer.  Dad prostate cancer.  1/2 Brother & 1/2 Brother A & W.  Son & Daughter A & W.    ROS:   GEN: normal without any fever, night sweats or weight loss  HEENT: normal with no HA's, sore throat, stiff neck, changes in vision  CV: normal with no CP, SOB, PND, PRADO or orthopnea  PULM: normal with no SOB, cough, hemoptysis, sputum or pleuritic pain  GI: normal with no abdominal pain, nausea, vomiting, constipation, diarrhea, melanotic stools, BRBPR, or hematemesis  : normal with no hematuria, dysuria  BREAST: See HPI  SKIN: normal with no rash, erythema, bruising, or swelling     Past Medical History:   Diagnosis Date    Breast cancer     Cancer     Breast      Past Surgical History:   Procedure Laterality Date    BIOPSY OF AXILLARY NODE N/A 08/17/2022    Procedure: BIOPSY, LYMPH NODE, AXILLARY;  Surgeon: Terry Tinajero MD;  Location: [facility] OR;  Service: General;  Laterality: N/A;    BREAST BIOPSY      MASTECTOMY N/A 08/17/2022    Procedure: MASTECTOMY;  Surgeon: Terry Tinajero MD;  Location: [facility] OR;  Service: General;  Laterality: N/A;  1) left mastectomy with axillary node dissection 19307  2) injection of Lymphazurin blue dye left chest    ROTATOR CUFF REPAIR Right 2008       Review of patient's allergies indicates:  No Known Allergies    Social History     Socioeconomic History    Marital status:    Occupational History    Occupation: retired   Tobacco Use    Smoking status: Never    Smokeless tobacco: Never   Substance and Sexual Activity    Alcohol use: Yes    Drug use: Not Currently    Sexual activity: Yes     Partners: Female     Social Determinants of Health     Financial Resource Strain: Low Risk  (8/25/2024)    Overall Financial Resource Strain (CARDIA)     Difficulty of Paying Living Expenses: Not very hard   Food Insecurity: No Food Insecurity (8/25/2024)    Hunger Vital Sign     Worried About Running Out of Food in the Last Year: Never true     Ran Out of Food in the Last Year: Never true   Physical Activity: Sufficiently Active (8/25/2024)    Exercise Vital Sign     Days of Exercise per Week: 6 days     Minutes of Exercise per Session: 60 min   Stress: No Stress Concern Present (8/25/2024)    [place] citizen [employer] - Occupational Stress Questionnaire     Feeling of Stress : Not at all   Housing Stability: Unknown (8/25/2024)    Housing Stability Vital Sign     Unable to Pay for Housing in the Last Year: No         Current Outpatient Medications:     ibuprofen (ADVIL,MOTRIN) 600 MG tablet, TAKE 1 TABLET BY MOUTH THREE TIMES DAILY AFTER A MEAL, Disp: , Rfl:           Objective:   Vitals:  Blood pressure (!) 164/79,  pulse 60, temperature 97.2 °F (36.2 °C), temperature source Temporal, resp. rate 14, height 6' (1.829 m), weight 81.7 kg (180 lb 3.2 oz), SpO2 97%.    Physical Examination:   GEN: no apparent distress, comfortable  HEAD: atraumatic and normocephalic  EYES: no pallor, no icterus  ENT: no pharyngeal erythema, external ears WNL; no nasal discharge  NECK: no masses, thyroid normal, trachea midline, no LAD/LN's, supple  CV: RRR with no murmur; normal pulse; normal S1 and S2; no pedal edema  CHEST: Normal respiratory effort; CTAB; normal breath sounds; no wheeze or crackles  ABDOM: nontender and nondistended; soft; no rebound/guarding, L/S NP  MUSC/Skeletal: ROM normal; no crepitus; joints normal  EXTREM: no clubbing, cyanosis, inflammation or swelling  SKIN: no rashes, lesions, ulcers, petechiae  : no cvat  NEURO: grossly intact; motor/sensory WNL; no tremors  PSYCH: normal mood, affect and behavior  LYMPH: normal cervical, supraclavicular, axillary LN's  BREASTS: L chest NT, incisions closed, no palpable mass.  R breast NT, no palpable mass.      Labs:   Lab Results   Component Value Date    WBC 6.56 02/20/2024    HGB 17.5 02/20/2024    HCT 53.8 02/20/2024     (H) 02/20/2024     02/20/2024    CMP  Sodium   Date Value Ref Range Status   02/20/2024 140 136 - 145 mmol/L Final     Potassium   Date Value Ref Range Status   02/20/2024 4.8 3.5 - 5.1 mmol/L Final     Chloride   Date Value Ref Range Status   02/20/2024 105 95 - 110 mmol/L Final     CO2   Date Value Ref Range Status   02/20/2024 25 23 - 29 mmol/L Final     Glucose   Date Value Ref Range Status   02/20/2024 99 70 - 110 mg/dL Final     BUN   Date Value Ref Range Status   02/20/2024 19 8 - 23 mg/dL Final     Creatinine   Date Value Ref Range Status   02/20/2024 1.1 0.5 - 1.4 mg/dL Final     Calcium   Date Value Ref Range Status   02/20/2024 9.3 8.7 - 10.5 mg/dL Final     Total Protein   Date Value Ref Range Status   02/20/2024 7.0 6.0 - 8.4 g/dL  Final     Albumin   Date Value Ref Range Status   02/20/2024 3.7 3.5 - 5.2 g/dL Final     Total Bilirubin   Date Value Ref Range Status   02/20/2024 0.7 0.1 - 1.0 mg/dL Final     Comment:     For infants and newborns, interpretation of results should be based  on gestational age, weight and in agreement with clinical  observations.    Premature Infant recommended reference ranges:  Up to 24 hours.............<8.0 mg/dL  Up to 48 hours............<12.0 mg/dL  3-5 days..................<15.0 mg/dL  6-29 days.................<15.0 mg/dL       Alkaline Phosphatase   Date Value Ref Range Status   02/20/2024 65 55 - 135 U/L Final     AST   Date Value Ref Range Status   02/20/2024 26 10 - 40 U/L Final     ALT   Date Value Ref Range Status   02/20/2024 27 10 - 44 U/L Final     Anion Gap   Date Value Ref Range Status   02/20/2024 10 8 - 16 mmol/L Final     eGFR if    Date Value Ref Range Status   07/18/2022 >60.0 >60 mL/min/1.73 m^2 Final     eGFR if non    Date Value Ref Range Status   07/18/2022 >60.0 >60 mL/min/1.73 m^2 Final     Comment:     Calculation used to obtain the estimated glomerular filtration  rate (eGFR) is the CKD-EPI equation.        PSA 1.85  TSH 2.1  CBC, CMP, tumor markers NL.  Assessment:   (1) 74 y.o. 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  ERP+, PRP+, H2N neg.  Fish 1+ positive, this is now called H2N low.  Local invasion into L breast nipple epidermis or skin without ulceration.  Stage 2 dx.    (2)Oncotype dx testing.  See discussion in HPI above.    (3)BrCa1&2 testing, was negative.  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  See discussion in HPI above.    Current Mamm, u/s, MRI  3 mm upper outer R breast nodule, suspected intramammary LN.  Check U/S R breast in 6 months.    Check MRI of breast 3/2025.  RTC 6 months.  PET now, RTC 4 weeks, can cancel.

## 2024-10-07 ENCOUNTER — TELEPHONE (OUTPATIENT)
Facility: CLINIC | Age: 74
End: 2024-10-07
Payer: MEDICARE

## 2024-10-08 ENCOUNTER — TELEPHONE (OUTPATIENT)
Facility: CLINIC | Age: 74
End: 2024-10-08
Payer: MEDICARE

## 2024-10-08 NOTE — TELEPHONE ENCOUNTER
Call Alexia Sharma NP or Courtney Chairez NP.  Do they have a path report from melanoma Bx and Surgery 8/30, 9/23.  They are in [place] or [place].

## 2024-10-09 ENCOUNTER — TELEPHONE (OUTPATIENT)
Facility: CLINIC | Age: 74
End: 2024-10-09
Payer: MEDICARE

## 2024-10-29 ENCOUNTER — TELEPHONE (OUTPATIENT)
Facility: CLINIC | Age: 74
End: 2024-10-29
Payer: MEDICARE

## 2024-11-01 ENCOUNTER — HOSPITAL ENCOUNTER (OUTPATIENT)
Dept: RADIOLOGY | Facility: HOSPITAL | Age: 74
Discharge: HOME | End: 2024-11-01
Attending: INTERNAL MEDICINE
Payer: MEDICARE

## 2024-11-01 VITALS — BODY MASS INDEX: 24.11 KG/M2 | HEIGHT: 72 IN | WEIGHT: 178 LBS

## 2024-11-01 DIAGNOSIS — C50.122 MALIGNANT NEOPLASM OF CENTRAL PORTION OF LEFT BREAST IN MALE, ESTROGEN RECEPTOR POSITIVE: ICD-10-CM

## 2024-11-01 DIAGNOSIS — Z17.0 MALIGNANT NEOPLASM OF CENTRAL PORTION OF LEFT BREAST IN MALE, ESTROGEN RECEPTOR POSITIVE: ICD-10-CM

## 2024-11-01 LAB — POCT GLUCOSE: 88 MG/DL (ref 70–110)

## 2024-11-01 PROCEDURE — A9552 F18 FDG: HCPCS | Mod: PO | Performed by: INTERNAL MEDICINE

## 2024-11-01 PROCEDURE — 78815 PET IMAGE W/CT SKULL-THIGH: CPT | Mod: 26,PI,, | Performed by: RADIOLOGY

## 2024-11-01 PROCEDURE — 78815 PET IMAGE W/CT SKULL-THIGH: CPT | Mod: TC,PS,PO

## 2024-11-01 RX ORDER — FLUDEOXYGLUCOSE F18 500 MCI/ML
11.6 INJECTION INTRAVENOUS
Status: COMPLETED | OUTPATIENT
Start: 2024-11-01 | End: 2024-11-01

## 2024-11-01 RX ADMIN — FLUDEOXYGLUCOSE F-18 11.6 MILLICURIE: 500 INJECTION INTRAVENOUS at 07:11

## 2024-11-05 ENCOUNTER — OFFICE VISIT (OUTPATIENT)
Facility: CLINIC | Age: 74
End: 2024-11-05
Payer: MEDICARE

## 2024-11-05 VITALS
HEART RATE: 64 BPM | WEIGHT: 171.88 LBS | HEIGHT: 72 IN | DIASTOLIC BLOOD PRESSURE: 74 MMHG | RESPIRATION RATE: 16 BRPM | BODY MASS INDEX: 23.28 KG/M2 | TEMPERATURE: 98 F | SYSTOLIC BLOOD PRESSURE: 156 MMHG

## 2024-11-05 DIAGNOSIS — C50.122 MALIGNANT NEOPLASM OF CENTRAL PORTION OF LEFT BREAST IN MALE, ESTROGEN RECEPTOR POSITIVE: Primary | ICD-10-CM

## 2024-11-05 DIAGNOSIS — Z17.0 MALIGNANT NEOPLASM OF CENTRAL PORTION OF LEFT BREAST IN MALE, ESTROGEN RECEPTOR POSITIVE: Primary | ICD-10-CM

## 2024-11-05 DIAGNOSIS — Z15.89 CHEK2 GENE MUTATION POSITIVE: ICD-10-CM

## 2024-11-05 DIAGNOSIS — R92.8 ABNORMAL MRI, BREAST: ICD-10-CM

## 2024-11-05 PROCEDURE — 99213 OFFICE O/P EST LOW 20 MIN: CPT | Mod: PBBFAC,PN | Performed by: INTERNAL MEDICINE

## 2024-11-05 PROCEDURE — G2211 COMPLEX E/M VISIT ADD ON: HCPCS | Mod: S$PBB,,, | Performed by: INTERNAL MEDICINE

## 2024-11-05 PROCEDURE — 99999 PR PBB SHADOW E&M-EST. PATIENT-LVL III: CPT | Mod: PBBFAC,,, | Performed by: INTERNAL MEDICINE

## 2024-11-05 PROCEDURE — 99215 OFFICE O/P EST HI 40 MIN: CPT | Mod: S$PBB,,, | Performed by: INTERNAL MEDICINE

## 2024-11-05 NOTE — LETTER
November 21, 2024        Alton Maguire MD  [address]  [place] [address]             [facility] - Hematology Oncology  [address]  [address]  [facility] [address]  Phone: [phone]  Fax: [phone]   Patient: Perez Kenyon   MR Number: [id number]   YOB: 1950   Date of Visit: 11/5/2024       Dear Dr. Maguire:    Thank you for referring Perez Kenyon to me for evaluation. Below are the relevant portions of my assessment and plan of care.            If you have questions, please do not hesitate to call me. I look forward to following Perez along with you.    Sincerely,      MYRON Barraza MD           CC  No Recipients

## 2024-11-21 NOTE — PROGRESS NOTES
[facility] [address] Hematology Oncology In Office Subsequent Encounter Note    24    Subjective:      Patient ID:   Perez Kenyon  74 y.o. male  1950    MD Madelin Gonsalez MD Angela Winfield, MD    Chief Complaint:   Breast cancer    HPI:  74 y.o. male found lump at L breast below the nipple area.  22.  Mamm 2.3 cm mass at retroareolar area.  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  ERP+, PRP+, H2N neg. Ki-67 10%.  L modified radical mastectomy 22   Invasive ductal carcinoma, 2.1 cm, grade 2.  DCIS +.  Invasive carcinoma goes into epidermis, skin, but with out ulceration of nipple.  Surgical margins are clear. 0/8 axillary LN are positive.    He has met with Dr. Madelin Arteaga of [facility] male breast cancer specialist.    Stage 2 Dx by primary size, LN negative.    Oncotype Dx test supports 19% recurrence at 9 years from Dx, when treated with Tamoxifen or Arimidex adjuvantly.  Recurrence Score was 30.  Oncotype Dx supported that adjuvant chemotherapy in addition to Endocrine or hormonal therapy would decrease recurrence risk further.  I would estimate down to 12-13%  Over 9 years of follow up.    I discussed adjuvant Tamoxifen or Arimidex with him x's 5-10 years, and adjuvant chemotherapy trial q 3 weeks x's 4-6 cycles to try decrease RR per Oncotype Dx reports.  He has decided to go with observation now.  He does not agree to adjuvant hormonal, endocrine or adjuvant chemotherapy Rx.    He has male breast cancer.  BRCA 1 & 2 are negative.  But he is POSITIVE for CHEK2 gene mutation.  Risk of breast cancer may be as high as 20-40% in females.  Also 5-10% risk of colon cancer.  Some studies support increased risk of melanoma and prostate cancer.    He is not interested in prophylactic mastectomy at the other breast.  His parents are . He has no siblings.  He has a son and 3 grand daughters.  The son can  have him self checked to see if he has CHEK2 mutation  Passed down to himself.  I think a 50/50% chance of passing this to his son.    Pt has discussed CHEK-2 mutation with his son, I don't believe the son is being tested.    He had a melanoma at upper mid back, Bx 8/30/24.  Surgery 9/23/24.  He has BPH, followed per Dr. Raphael of .  He had sebaceous cyst removed 9/23/24.  Tumor markers now NL.    C/O LBP and hip pain.  Check PET now.  RTC 1 month, can cancel.  RTC 6 months with MRI and TM.    CT of abdomin 6/1/23 showed 4 mm LLL nodule, hepatic and renal cysts, enlargement of prostate, no hydronephrosis or lymphadenopathy.    He c/o feeling fullness at L scapula area.  On exam he has good ROM, NT and I do not palpate a mass or nodule.  I recommended CT of chest and posterior chest wall area. This would evaluate his scapular sx and check lung for other nodules?  He did not agree to ordering of the CT.    Last mamm and U/S was 2/17/23, I will order mamm and U/S, tumor markers [facility] after 2/17/24  Tumor markers are NL now.  Mamm., U/S, MRI 3 mm nodule at R breast, suspected intramammary LN..    Plan to recheck the studies in April and I will see him in May.    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  There is a right 7 mm lymph node seen in the axilla with an SUV of 5.1.  This is probably reactive but will need to be monitored.  11/1/24    R rotator cuff surgery 2008.  Last colonoscopy 11/11/20, Dr. Park.    Smoke no, ETOH yes, Allergy no.    Meds pepcid.    Mom CVA, no cancer.  Dad prostate cancer.  1/2 Brother & 1/2 Brother A & W.  Son & Daughter A & W.    ROS:   GEN: normal without any fever, night sweats or weight loss  HEENT: normal with no HA's, sore throat, stiff neck, changes in vision  CV: normal with no CP, SOB, PND, PRADO or orthopnea  PULM: normal with no SOB, cough, hemoptysis, sputum or pleuritic pain  GI: normal with no  abdominal pain, nausea, vomiting, constipation, diarrhea, melanotic stools, BRBPR, or hematemesis  : normal with no hematuria, dysuria  BREAST: See HPI  SKIN: normal with no rash, erythema, bruising, or swelling     Past Medical History:   Diagnosis Date    Breast cancer     Cancer     Breast     Past Surgical History:   Procedure Laterality Date    BIOPSY OF AXILLARY NODE N/A 08/17/2022    Procedure: BIOPSY, LYMPH NODE, AXILLARY;  Surgeon: Terry Tinajero MD;  Location: [facility] OR;  Service: General;  Laterality: N/A;    BREAST BIOPSY      MASTECTOMY N/A 08/17/2022    Procedure: MASTECTOMY;  Surgeon: Terry Tinajero MD;  Location: [facility] OR;  Service: General;  Laterality: N/A;  1) left mastectomy with axillary node dissection 19307  2) injection of Lymphazurin blue dye left chest    ROTATOR CUFF REPAIR Right 2008       Review of patient's allergies indicates:  No Known Allergies    Social History     Socioeconomic History    Marital status:    Occupational History    Occupation: retired   Tobacco Use    Smoking status: Never    Smokeless tobacco: Never   Substance and Sexual Activity    Alcohol use: Yes    Drug use: Not Currently    Sexual activity: Yes     Partners: Female     Social Drivers of Health     Financial Resource Strain: Low Risk  (8/25/2024)    Overall Financial Resource Strain (CARDIA)     Difficulty of Paying Living Expenses: Not very hard   Food Insecurity: No Food Insecurity (8/25/2024)    Hunger Vital Sign     Worried About Running Out of Food in the Last Year: Never true     Ran Out of Food in the Last Year: Never true   Physical Activity: Sufficiently Active (8/25/2024)    Exercise Vital Sign     Days of Exercise per Week: 6 days     Minutes of Exercise per Session: 60 min   Stress: No Stress Concern Present (8/25/2024)    [employer] - Occupational Stress Questionnaire     Feeling of Stress : Not at all   Housing Stability: Unknown (8/25/2024)     Housing Stability Vital Sign     Unable to Pay for Housing in the Last Year: No         Current Outpatient Medications:     ibuprofen (ADVIL,MOTRIN) 600 MG tablet, TAKE 1 TABLET BY MOUTH THREE TIMES DAILY AFTER A MEAL, Disp: , Rfl:           Objective:   Vitals:  Blood pressure (!) 156/74, pulse 64, temperature 98.3 °F (36.8 °C), temperature source Temporal, resp. rate 16, height 6' (1.829 m), weight 78 kg (171 lb 14.4 oz).    Physical Examination:   GEN: no apparent distress, comfortable  HEAD: atraumatic and normocephalic  EYES: no pallor, no icterus  ENT: no pharyngeal erythema, external ears WNL; no nasal discharge  NECK: no masses, thyroid normal, trachea midline, no LAD/LN's, supple  CV: RRR with no murmur; normal pulse; normal S1 and S2; no pedal edema  CHEST: Normal respiratory effort; CTAB; normal breath sounds; no wheeze or crackles  ABDOM: nontender and nondistended; soft; no rebound/guarding, L/S NP  MUSC/Skeletal: ROM normal; no crepitus; joints normal  EXTREM: no clubbing, cyanosis, inflammation or swelling  SKIN: no rashes, lesions, ulcers, petechiae  : no cvat  NEURO: grossly intact; motor/sensory WNL; no tremors  PSYCH: normal mood, affect and behavior  LYMPH: normal cervical, supraclavicular, axillary LN's  BREASTS: L chest NT, incisions closed, no palpable mass.  R breast NT, no palpable mass.      Labs:   Lab Results   Component Value Date    WBC 6.56 02/20/2024    HGB 17.5 02/20/2024    HCT 53.8 02/20/2024     (H) 02/20/2024     02/20/2024    CMP  Sodium   Date Value Ref Range Status   02/20/2024 140 136 - 145 mmol/L Final     Potassium   Date Value Ref Range Status   02/20/2024 4.8 3.5 - 5.1 mmol/L Final     Chloride   Date Value Ref Range Status   02/20/2024 105 95 - 110 mmol/L Final     CO2   Date Value Ref Range Status   02/20/2024 25 23 - 29 mmol/L Final     Glucose   Date Value Ref Range Status   02/20/2024 99 70 - 110 mg/dL Final     BUN   Date Value Ref Range Status    02/20/2024 19 8 - 23 mg/dL Final     Creatinine   Date Value Ref Range Status   02/20/2024 1.1 0.5 - 1.4 mg/dL Final     Calcium   Date Value Ref Range Status   02/20/2024 9.3 8.7 - 10.5 mg/dL Final     Total Protein   Date Value Ref Range Status   02/20/2024 7.0 6.0 - 8.4 g/dL Final     Albumin   Date Value Ref Range Status   02/20/2024 3.7 3.5 - 5.2 g/dL Final     Total Bilirubin   Date Value Ref Range Status   02/20/2024 0.7 0.1 - 1.0 mg/dL Final     Comment:     For infants and newborns, interpretation of results should be based  on gestational age, weight and in agreement with clinical  observations.    Premature Infant recommended reference ranges:  Up to 24 hours.............<8.0 mg/dL  Up to 48 hours............<12.0 mg/dL  3-5 days..................<15.0 mg/dL  6-29 days.................<15.0 mg/dL       Alkaline Phosphatase   Date Value Ref Range Status   02/20/2024 65 55 - 135 U/L Final     AST   Date Value Ref Range Status   02/20/2024 26 10 - 40 U/L Final     ALT   Date Value Ref Range Status   02/20/2024 27 10 - 44 U/L Final     Anion Gap   Date Value Ref Range Status   02/20/2024 10 8 - 16 mmol/L Final     eGFR if    Date Value Ref Range Status   07/18/2022 >60.0 >60 mL/min/1.73 m^2 Final     eGFR if non    Date Value Ref Range Status   07/18/2022 >60.0 >60 mL/min/1.73 m^2 Final     Comment:     Calculation used to obtain the estimated glomerular filtration  rate (eGFR) is the CKD-EPI equation.        PSA 1.85  TSH 2.1  CBC, CMP, tumor markers NL.  Assessment:   (1) 74 y.o. 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  ERP+, PRP+, H2N neg.  Fish 1+ positive, this is now called H2N low.  Local invasion into L breast nipple epidermis or skin without ulceration.  Stage 2 dx.    (2)Oncotype dx testing.  See discussion in HPI above.    (3)BrCa1&2 testing, was negative.  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  See discussion  in HPI above.    Current Mamm, u/s, MRI  3 mm upper outer R breast nodule, suspected intramammary LN.  Check U/S, mamm R breast in 6 months.    Check MRI of breast 3/2025.  RTC 6 months.  PET now, 7mm R axillary LN.    RTC 4/1/25.

## (undated) DEVICE — GLOVE SURG ULTRA TOUCH 7

## (undated) DEVICE — BLADE EZ CLEAN 2.5IN MODIFIED

## (undated) DEVICE — UNDERGLOVES BIOGEL PI SZ 7 LF

## (undated) DEVICE — GLOVE SURG ULTRA TOUCH 7.5

## (undated) DEVICE — SUT MONOCRYL 4-0 PS-2

## (undated) DEVICE — SUT 2/0 30IN SILK BLK BRAI

## (undated) DEVICE — DRAIN WOUND 19FR TROCAR

## (undated) DEVICE — Device

## (undated) DEVICE — CANISTER SUCTION 3000CC

## (undated) DEVICE — SUT CTD VICRYL 3-0 CR/SH

## (undated) DEVICE — DRAPE THREE-QUARTER 53X77IN

## (undated) DEVICE — SOL 9P NACL IRR PIC IL

## (undated) DEVICE — NDL HYPODERMIC BLUNT 18G 1.5IN

## (undated) DEVICE — SPONGE LAP 18X18 PREWASHED

## (undated) DEVICE — GLOVE SURGEONS ULTRA TOUCH 6.5

## (undated) DEVICE — EVACUATOR WOUND BULB 100CC

## (undated) DEVICE — LABEL FOR UTILITY MARKER

## (undated) DEVICE — SYR 50CC LL